# Patient Record
Sex: MALE | Race: WHITE | NOT HISPANIC OR LATINO | Employment: FULL TIME | URBAN - METROPOLITAN AREA
[De-identification: names, ages, dates, MRNs, and addresses within clinical notes are randomized per-mention and may not be internally consistent; named-entity substitution may affect disease eponyms.]

---

## 2019-01-09 ENCOUNTER — TELEPHONE (OUTPATIENT)
Dept: GASTROENTEROLOGY | Facility: CLINIC | Age: 59
End: 2019-01-09

## 2019-01-09 NOTE — TELEPHONE ENCOUNTER
Pt confirmed that he's still coming to his appt on 01/11/19 at 11:00AM.     ----- Message from Montserrat Garnica sent at 1/9/2019 11:40 AM CST -----  Contact: pt#[phone]  Needs Advice    Reason for call:Pt states that he received a call that he needed to call to confirm appt         Communication Preference:call    Additional Information:

## 2019-01-11 ENCOUNTER — OFFICE VISIT (OUTPATIENT)
Dept: GASTROENTEROLOGY | Facility: CLINIC | Age: 59
End: 2019-01-11
Payer: COMMERCIAL

## 2019-01-11 VITALS
HEART RATE: 64 BPM | SYSTOLIC BLOOD PRESSURE: 144 MMHG | HEIGHT: 73 IN | WEIGHT: 213.81 LBS | BODY MASS INDEX: 28.34 KG/M2 | DIASTOLIC BLOOD PRESSURE: 91 MMHG

## 2019-01-11 DIAGNOSIS — R10.84 GENERALIZED ABDOMINAL PAIN: ICD-10-CM

## 2019-01-11 DIAGNOSIS — K58.0 IRRITABLE BOWEL SYNDROME WITH DIARRHEA: Primary | ICD-10-CM

## 2019-01-11 PROCEDURE — 99999 PR PBB SHADOW E&M-EST. PATIENT-LVL III: ICD-10-PCS | Mod: PBBFAC,,, | Performed by: PHYSICIAN ASSISTANT

## 2019-01-11 PROCEDURE — 99204 OFFICE O/P NEW MOD 45 MIN: CPT | Mod: S$GLB,,, | Performed by: PHYSICIAN ASSISTANT

## 2019-01-11 PROCEDURE — 99204 PR OFFICE/OUTPT VISIT, NEW, LEVL IV, 45-59 MIN: ICD-10-PCS | Mod: S$GLB,,, | Performed by: PHYSICIAN ASSISTANT

## 2019-01-11 PROCEDURE — 3008F BODY MASS INDEX DOCD: CPT | Mod: CPTII,S$GLB,, | Performed by: PHYSICIAN ASSISTANT

## 2019-01-11 PROCEDURE — 99999 PR PBB SHADOW E&M-EST. PATIENT-LVL III: CPT | Mod: PBBFAC,,, | Performed by: PHYSICIAN ASSISTANT

## 2019-01-11 PROCEDURE — 3008F PR BODY MASS INDEX (BMI) DOCUMENTED: ICD-10-PCS | Mod: CPTII,S$GLB,, | Performed by: PHYSICIAN ASSISTANT

## 2019-01-11 RX ORDER — MONTELUKAST SODIUM 4 MG/1
1 TABLET, CHEWABLE ORAL 2 TIMES DAILY
Qty: 60 TABLET | Refills: 3 | Status: SHIPPED | OUTPATIENT
Start: 2019-01-11 | End: 2021-08-16 | Stop reason: SDUPTHER

## 2019-01-11 RX ORDER — DICYCLOMINE HYDROCHLORIDE 10 MG/1
10 CAPSULE ORAL EVERY 8 HOURS PRN
Qty: 90 CAPSULE | Refills: 1 | Status: SHIPPED | OUTPATIENT
Start: 2019-01-11 | End: 2019-02-10

## 2019-01-11 NOTE — PROGRESS NOTES
[facility] Consultation Note    Reason for Consult:  The primary encounter diagnosis was Irritable bowel syndrome with diarrhea. A diagnosis of Generalized abdominal pain was also pertinent to this visit.    PCP:   Rain Ash   No address on file    Referring MD:  Aaareferral Self  No address on file    HPI:  This is a 58 y.o. male  With Hx of IBS and C. Diff presents for evaluation of chronic diarrhea  Last seen by Dr Boland in 2013. Questran and bentyl were prescribed with improvement    Today he reports worsening chronic post-prandial dairrhea x 1 year  6-8 BM daily, Watery  During periods of fasting - will still have a BM  Associated abdominal cramping pain  Denies rectal bleeding    Large intake of artificial sweeteners    No relief with diet changes   Denies med changes    Recent thyroid test and Cbc were normal    ROS:  Constitutional: No fevers, chills, No weight loss  ENT: No allergies  CV: No chest pain  Pulm: No cough, No shortness of breath  Ophtho: No vision changes  GI: see HPI  Derm: No rash  Heme: No lymphadenopathy, No bruising  MSK: No arthritis  : No dysuria, No hematuria  Endo: No hot or cold intolerance  Neuro: No syncope, No seizure  Psych: No anxiety, No depression    Medical History:  has a past medical history of Chronic diarrhea, GERD (gastroesophageal reflux disease), Hernia of unspecified site of abdominal cavity without mention of obstruction or gangrene, Intestinal infection due to Clostridium difficile, Irritable bowel syndrome with diarrhea (1/11/2019), and Personal history of kidney stones.    Surgical History:  has a past surgical history that includes Hiatal hernia repair; Hernia repair; Colonoscopy; Upper gastrointestinal endoscopy; Amputation (left great toe); Foot surgery; Knee surgery; Back surgery; Neck surgery; Cholecystectomy; Sinus surgery; Hernia repair; Fracture surgery; and Spine surgery.    Family History: family history includes Cancer in  "his brother; Heart disease in his father; Hyperlipidemia in his father and mother; Hypertension in his father; Stroke in his father..     Social History:  reports that  has never smoked. He does not have any smokeless tobacco history on file. He reports that he drinks alcohol. He reports that he does not use drugs.    Review of patient's allergies indicates:  No Known Allergies    Current Outpatient Medications on File Prior to Visit   Medication Sig Dispense Refill    buPROPion (WELLBUTRIN XL) 300 MG 24 hr tablet Take 300 mg by mouth once daily once daily.      escitalopram (LEXAPRO) 20 MG tablet Take by mouth. 1 Tablet Oral Every day      testosterone cypionate (DEPO-TESTOSTERONE) 200 mg/mL injection Inject 1 mg/mL into the muscle. 1 Oil Intramuscular 2 weeks.  200 mg every 2 weeks       [DISCONTINUED] atorvastatin (LIPITOR) 20 MG tablet Take 20 mg by mouth once daily.        [DISCONTINUED] cholestyramine (QUESTRAN) 4 gram packet Take 1 packet (4 g total) by mouth 2 (two) times daily. 60 packet 3    [DISCONTINUED] ketorolac (TORADOL) 10 mg tablet Take 10 mg by mouth every 8 (eight) hours as needed. 1 Tablet Oral Every 8 hours.  Do not use > 3 consecutive days       No current facility-administered medications on file prior to visit.          Objective Findings:    Vital Signs:  BP (!) 144/91   Pulse 64   Ht 6' 1" (1.854 m)   Wt 97 kg (213 lb 12.8 oz)   BMI 28.21 kg/m²   Body mass index is 28.21 kg/m².    Physical Exam:  General Appearance: Well appearing in no acute distress  Head:   Normocephalic, without obvious abnormality  Eyes:    No scleral icterus  ENT: Neck supple, Lips, mucosa, and tongue normal  Lungs: CTA bilaterally in anterior and posterior fields, no wheezes, no crackles.  Heart:  Regular rate and rhythm, S1, S2 normal, no murmurs heard  Abdomen: Soft, hyperactive bowel sounds, lower abdominal tenderness, non distended with positive bowel sounds in all four quadrants.   Extremities: no  " edema  Skin: No rash  Neurologic: AAO x 3      Labs:  Lab Results   Component Value Date    WBC 6.97 04/05/2013    HGB 15.9 04/05/2013    HCT 45.7 04/05/2013     04/05/2013    ALT 26 04/05/2013    AST 23 04/05/2013     04/05/2013    K 4.2 04/05/2013     04/05/2013    CREATININE 1.0 04/05/2013    BUN 17 04/05/2013    CO2 25 04/05/2013    TSH 0.947 04/05/2013       Imaging:    Endoscopy:    EGD 4/2013  Colon 12/2012 - f/u in 7yrs  Assessment:  1. Irritable bowel syndrome with diarrhea    2. Generalized abdominal pain       57yo M s/p bushra with Hx of C. Diff and  IBS presents with worsening abdominal pain and mainly post-pradial diarrhea x 1 yr    Recommendations:  1. stool tests to rule out  infection, inflammation, and malabsorption.    2. colestid BID. He did not want to take questran    3. Bentyl 10 prn    4. Avoid artificial sweeteners  Due for a colonoscopy 12/2019    No Follow-up on file.      Order summary:  Orders Placed This Encounter    Clostridium difficile EIA    Stool culture    Stool Exam-Ova,Cysts,Parasites    Stool Exam-Ova,Cysts,Parasites    Stool Exam-Ova,Cysts,Parasites    Giardia / Cryptosporidum, EIA    Giardia / Cryptosporidum, EIA    Giardia / Cryptosporidum, EIA    WBC, Stool    Fecal fat, qualitative    colestipol (COLESTID) 1 gram Tab    dicyclomine (BENTYL) 10 MG capsule         Thank you so much for allowing me to participate in the care of Blaine Mccurdy PA-C

## 2019-01-11 NOTE — PATIENT INSTRUCTIONS
Avoid artificial sweeterners    Low Fodmap Diet    Fodmaps (fructo,oligo,mono saccharides and polyols) are contained in certain foods and can cause significant bloating in some people. Reducing these foods can reduce bloating. Start off by cutting out anything with high fructose corn syrup in the ingredients.        Foods suitable on a low-fodmap diet  fruit  banana, blueberry, boysenberry, canteloupe, cranberry, durian, grape,  grapefruit, honeydew melon, kiwifruit, lemon,lime, mandarin, orange,  passionfruit, pawpaw, raspberry, rhubarb, rockmelon, star anise,  strawberry, tangelo  vegetables  alfalfa, artichoke, bamboo shoots, bean shoots, bok grant,  carrot, celery, choko, grant sum, endive, jayme, green beans,  lettuce, olives, parsnip, potato, pumpkin, red capsicum (bell pepper),  silver beet, spinach, summer squash (yellow), swede, sweet potato, taro, tomato,  turnip, yam, zucchini   herbs  basil, chili, coriander, jayme, lemongrass,   marjoram, mint, oregano, parsley, rosemary, thyme  milk  lactose-free milk, oat milk*, rice milk, soy milk*  cheeses  hard cheeses, and brie and camembert  yoghurt  lactose-free varieties  ice-cream substitutes  gelati, sorbet  butter substitutes  olive oilgrain foods  cereals  gluten-free bread or cereal products  bread  100% spelt bread  Rice, oats, polenta, other  arrowroot, millet, psyllium, quinoa, sorgum, tapioca  sweeteners  sugar* (sucrose), glucose, artificial sweeteners not ending in -ol  honey substitutes  june syrup*, maple syrup*, molasses, treacle  *small quantities  *check for additives  Note: if fruit is dried, eat in small quantities    excess fructose lactose fructans galactans polyols      Eliminate foods containing fodmaps  fruit  apple, apricot, avocado, blackberry, cherry, lychee, nashi, nectarine, peach, pear, plum,  prune, watermelon, sherwin, tinned fruit in natural juice,   sweeteners  sorbitol (420)  mannitol (421)  isomalt (953)  maltitol  (965)  xylitol (967)  legumes  baked beans, chickpeas, kidney beans, lentils  vegetables  asparagus, beetroot, broccoli, brussels sprouts, cabbage, eggplant, fennel, garlic,  dar, okra, onion (all), shallots, spring onion, cauliflower, green capsicum (bell pepper), mushroom, sweet corn  cereals  wheat and rye, in large amounts eg. Bread, crackers, cookies, couscous, pasta  fruit  custard apple, persimmon, watermelon  miscellaneous  chicory, dandelion, inulin  sweeteners  fructose, high fructose corn syrup  large total fructose dose  concentrated fruit sources, large servings of fruit, dried fruit, fruit juice  honey  corn syrup, fruisana  milk  milk from cows, goats or sheep, custard, ice cream, yoghurt  cheeses  soft unripened cheeses eg. cottage, cream, mascarpone    Additional FODMAPs Diet Reading List    IBS-Free At Last!: Second Edition: Change Your Carbs, Change Your Life with the FODMAP Elimination Diet, 2 nd edition by Rachel Garg MS, RD     2012 copyright; [employer].   ISBN: 407-4-4204337-2-1    The Complete Idiots Guide To Living Well With IBS by Janet Nicolas RD, DALILAN  [facility], 2010    The FODMAPs Approach:--Minimize Consumption of Fermentable Carbs to Manage Functional Gut Disorder Symptoms  by Janet Nicolas RD, DALILAN article found in  Todays Dietitian, vol. 12, no. 8, p. 30    The Inside Tract: Your Good Gut Guide To Great Digestive Health by Seven Barnett MD and Jodi Crenshaw, MS, RD, LDN  2011 copyright, [employer]   ISBN: [phone]    List assembled by: Jaqueline Ochoa, MS, RD, LDN, CDE [facility]  Last Updated: 7/13/12

## 2019-01-14 ENCOUNTER — LAB VISIT (OUTPATIENT)
Dept: LAB | Facility: HOSPITAL | Age: 59
End: 2019-01-14
Attending: INTERNAL MEDICINE
Payer: COMMERCIAL

## 2019-01-14 DIAGNOSIS — K58.0 IRRITABLE BOWEL SYNDROME WITH DIARRHEA: ICD-10-CM

## 2019-01-14 PROCEDURE — 87329 GIARDIA AG IA: CPT

## 2019-01-14 PROCEDURE — 87209 SMEAR COMPLEX STAIN: CPT

## 2019-01-15 ENCOUNTER — LAB VISIT (OUTPATIENT)
Dept: LAB | Facility: HOSPITAL | Age: 59
End: 2019-01-15
Attending: PHYSICIAN ASSISTANT
Payer: COMMERCIAL

## 2019-01-15 DIAGNOSIS — K58.0 IRRITABLE BOWEL SYNDROME WITH DIARRHEA: ICD-10-CM

## 2019-01-15 LAB
CRYPTOSP AG STL QL IA: NEGATIVE
G LAMBLIA AG STL QL IA: NEGATIVE
O+P STL TRI STN: NORMAL

## 2019-01-15 PROCEDURE — 87045 FECES CULTURE AEROBIC BACT: CPT

## 2019-01-15 PROCEDURE — 89125 SPECIMEN FAT STAIN: CPT

## 2019-01-15 PROCEDURE — 87329 GIARDIA AG IA: CPT

## 2019-01-15 PROCEDURE — 87046 STOOL CULTR AEROBIC BACT EA: CPT | Mod: 59

## 2019-01-15 PROCEDURE — 87427 SHIGA-LIKE TOXIN AG IA: CPT | Mod: 59

## 2019-01-15 PROCEDURE — 89055 LEUKOCYTE ASSESSMENT FECAL: CPT

## 2019-01-16 ENCOUNTER — LAB VISIT (OUTPATIENT)
Dept: LAB | Facility: HOSPITAL | Age: 59
End: 2019-01-16
Attending: PHYSICIAN ASSISTANT
Payer: COMMERCIAL

## 2019-01-16 DIAGNOSIS — K58.0 IRRITABLE BOWEL SYNDROME WITH DIARRHEA: ICD-10-CM

## 2019-01-16 LAB
CRYPTOSP AG STL QL IA: NEGATIVE
FAT STL SUDAN IV STN: NORMAL
G LAMBLIA AG STL QL IA: NEGATIVE
WBC #/AREA STL HPF: ABNORMAL /[HPF]

## 2019-01-16 PROCEDURE — 87209 SMEAR COMPLEX STAIN: CPT

## 2019-01-16 PROCEDURE — 87329 GIARDIA AG IA: CPT

## 2019-01-17 LAB
CRYPTOSP AG STL QL IA: NEGATIVE
E COLI SXT1 STL QL IA: NEGATIVE
E COLI SXT2 STL QL IA: NEGATIVE
G LAMBLIA AG STL QL IA: NEGATIVE
O+P STL TRI STN: NORMAL

## 2019-01-18 ENCOUNTER — TELEPHONE (OUTPATIENT)
Dept: GASTROENTEROLOGY | Facility: CLINIC | Age: 59
End: 2019-01-18

## 2019-01-18 NOTE — TELEPHONE ENCOUNTER
Attempted to call pt regarding stool results pt didn't answer left vm instructing pt to call back.       ----- Message from Ned Mccurdy PA-C sent at 1/18/2019  2:16 PM CST -----  Please notify the pt that his stool tests were negative for infection

## 2019-01-19 ENCOUNTER — TELEPHONE (OUTPATIENT)
Dept: GASTROENTEROLOGY | Facility: CLINIC | Age: 59
End: 2019-01-19

## 2019-01-19 DIAGNOSIS — K52.9 CHRONIC DIARRHEA: Primary | ICD-10-CM

## 2019-01-19 DIAGNOSIS — R10.30 LOWER ABDOMINAL PAIN: ICD-10-CM

## 2019-01-19 LAB — BACTERIA STL CULT: NORMAL

## 2019-01-21 NOTE — TELEPHONE ENCOUNTER
Attempted to call pt regarding stool results and ct scan pt didn't answer left vm instructing pt to call back.

## 2019-01-24 ENCOUNTER — TELEPHONE (OUTPATIENT)
Dept: GASTROENTEROLOGY | Facility: CLINIC | Age: 59
End: 2019-01-24

## 2019-01-24 NOTE — TELEPHONE ENCOUNTER
Pt aware and understanding of his stool results and CT scan appt on 02/07/19 at 12:15PM and lab appt 02/07/19 at 11:00AM. Informed pt that his stool kit is at the  ready for pickup. Will mail confirmation.         ----- Message from Amber Chance sent at 1/24/2019  2:10 PM CST -----  Contact: Self- [phone]  Kaz- pt returning a missed call regarding stool results and ct scan- please contact pt at [phone]

## 2019-02-07 ENCOUNTER — HOSPITAL ENCOUNTER (OUTPATIENT)
Dept: RADIOLOGY | Facility: HOSPITAL | Age: 59
Discharge: HOME | End: 2019-02-07
Attending: PHYSICIAN ASSISTANT
Payer: COMMERCIAL

## 2019-02-07 DIAGNOSIS — R10.30 LOWER ABDOMINAL PAIN: ICD-10-CM

## 2019-02-07 DIAGNOSIS — K52.9 CHRONIC DIARRHEA: ICD-10-CM

## 2019-02-07 PROCEDURE — 74177 CT ABD & PELVIS W/CONTRAST: CPT | Mod: 26,,, | Performed by: RADIOLOGY

## 2019-02-07 PROCEDURE — 74177 CT ABD & PELVIS W/CONTRAST: CPT | Mod: TC

## 2019-02-07 PROCEDURE — 25500020 PHARM REV CODE 255: Performed by: PHYSICIAN ASSISTANT

## 2019-02-07 PROCEDURE — 74177 CT ABDOMEN PELVIS WITH CONTRAST: ICD-10-PCS | Mod: 26,,, | Performed by: RADIOLOGY

## 2019-02-07 RX ADMIN — IOHEXOL 15 ML: 350 INJECTION, SOLUTION INTRAVENOUS at 11:02

## 2019-02-07 RX ADMIN — IOHEXOL 100 ML: 350 INJECTION, SOLUTION INTRAVENOUS at 01:02

## 2019-02-08 ENCOUNTER — TELEPHONE (OUTPATIENT)
Dept: GASTROENTEROLOGY | Facility: CLINIC | Age: 59
End: 2019-02-08

## 2019-02-08 DIAGNOSIS — K52.9 CHRONIC DIARRHEA: Primary | ICD-10-CM

## 2019-02-08 DIAGNOSIS — K76.0 HEPATIC STEATOSIS: Primary | ICD-10-CM

## 2019-02-08 RX ORDER — CIPROFLOXACIN 500 MG/1
500 TABLET ORAL 2 TIMES DAILY
Qty: 28 TABLET | Refills: 0 | Status: SHIPPED | OUTPATIENT
Start: 2019-02-08 | End: 2019-02-15

## 2019-02-08 RX ORDER — METRONIDAZOLE 500 MG/1
500 TABLET ORAL 3 TIMES DAILY
Qty: 42 TABLET | Refills: 0 | Status: SHIPPED | OUTPATIENT
Start: 2019-02-08 | End: 2019-02-15

## 2019-02-08 NOTE — TELEPHONE ENCOUNTER
----- Message from Talon Dhillon MA sent at 2/8/2019  2:17 PM CST -----  Pt stated that when he eats the food goes straight through him and he also has abdominal cramping if he eats anything.

## 2019-02-08 NOTE — TELEPHONE ENCOUNTER
Attempted to call pt regarding huis CT results and get an update on his symptoms pt didn't answer left vm instructing pt to call back.

## 2019-02-08 NOTE — TELEPHONE ENCOUNTER
Pt aware and understanding of his CT scan results and appt with liver specialist on 02/15/19 at 1:40PM. Will mail conformation.         ----- Message from Chloé Yoder MA sent at 2/8/2019  1:48 PM CST -----  Contact: [phone]  Patient Returning Call from [facility]    Who Left Message for Patient: Talon     Communication Preference: [phone]    Additional Information: Returning a call re: ct scan results

## 2019-02-11 ENCOUNTER — TELEPHONE (OUTPATIENT)
Dept: GASTROENTEROLOGY | Facility: CLINIC | Age: 59
End: 2019-02-11

## 2019-02-11 NOTE — TELEPHONE ENCOUNTER
Spoke with pt wife per pt requestand informed her of his lab results, CT scan results, appt with liver specialist, and Kaz sent over 2 antibiotics to his pharmacy to take for his symptoms and if he was still feeling bad then he should consider getting a colonoscopy.

## 2019-02-11 NOTE — TELEPHONE ENCOUNTER
Pt aware and understanding of his lab results and Kaz sent over 2 antibiotics to his pharmacy for his symptom,s and if he doesn't fell better then consider getting a colonoscopy.           ----- Message from Ned Mccurdy PA-C sent at 2/10/2019 10:50 AM CST -----  Please notify the pt that his recent labwork looked good. No signs of infection or anemia. Liver and kidney labs are normal

## 2019-02-15 ENCOUNTER — LAB VISIT (OUTPATIENT)
Dept: LAB | Facility: HOSPITAL | Age: 59
End: 2019-02-15
Attending: PHYSICIAN ASSISTANT
Payer: COMMERCIAL

## 2019-02-15 ENCOUNTER — OFFICE VISIT (OUTPATIENT)
Dept: HEPATOLOGY | Facility: CLINIC | Age: 59
End: 2019-02-15
Payer: COMMERCIAL

## 2019-02-15 ENCOUNTER — PROCEDURE VISIT (OUTPATIENT)
Dept: HEPATOLOGY | Facility: CLINIC | Age: 59
End: 2019-02-15
Payer: COMMERCIAL

## 2019-02-15 VITALS
WEIGHT: 212.75 LBS | BODY MASS INDEX: 28.2 KG/M2 | HEIGHT: 73 IN | RESPIRATION RATE: 18 BRPM | SYSTOLIC BLOOD PRESSURE: 137 MMHG | OXYGEN SATURATION: 96 % | HEART RATE: 73 BPM | DIASTOLIC BLOOD PRESSURE: 81 MMHG

## 2019-02-15 DIAGNOSIS — K76.0 HEPATIC STEATOSIS: Primary | ICD-10-CM

## 2019-02-15 DIAGNOSIS — E66.3 OVERWEIGHT (BMI 25.0-29.9): ICD-10-CM

## 2019-02-15 DIAGNOSIS — K76.9 LIVER LESION: ICD-10-CM

## 2019-02-15 DIAGNOSIS — K76.0 HEPATIC STEATOSIS: ICD-10-CM

## 2019-02-15 DIAGNOSIS — K52.9 CHRONIC DIARRHEA: ICD-10-CM

## 2019-02-15 PROCEDURE — 3008F BODY MASS INDEX DOCD: CPT | Mod: CPTII,S$GLB,, | Performed by: NURSE PRACTITIONER

## 2019-02-15 PROCEDURE — 99204 PR OFFICE/OUTPT VISIT, NEW, LEVL IV, 45-59 MIN: ICD-10-PCS | Mod: S$GLB,,, | Performed by: NURSE PRACTITIONER

## 2019-02-15 PROCEDURE — 99999 PR PBB SHADOW E&M-EST. PATIENT-LVL IV: ICD-10-PCS | Mod: PBBFAC,,, | Performed by: NURSE PRACTITIONER

## 2019-02-15 PROCEDURE — 87209 SMEAR COMPLEX STAIN: CPT

## 2019-02-15 PROCEDURE — 99999 PR PBB SHADOW E&M-EST. PATIENT-LVL IV: CPT | Mod: PBBFAC,,, | Performed by: NURSE PRACTITIONER

## 2019-02-15 PROCEDURE — 91200 LIVER ELASTOGRAPHY: CPT | Mod: S$GLB,,, | Performed by: NURSE PRACTITIONER

## 2019-02-15 PROCEDURE — 91200 PR LIVER ELASTOGRAPHY W/OUT IMAG W/INTERP & REPORT: ICD-10-PCS | Mod: S$GLB,,, | Performed by: NURSE PRACTITIONER

## 2019-02-15 PROCEDURE — 99204 OFFICE O/P NEW MOD 45 MIN: CPT | Mod: S$GLB,,, | Performed by: NURSE PRACTITIONER

## 2019-02-15 PROCEDURE — 3008F PR BODY MASS INDEX (BMI) DOCUMENTED: ICD-10-PCS | Mod: CPTII,S$GLB,, | Performed by: NURSE PRACTITIONER

## 2019-02-15 RX ORDER — AMOXICILLIN 500 MG
CAPSULE ORAL DAILY
COMMUNITY
End: 2021-11-17

## 2019-02-15 NOTE — LETTER
February 15, 2019      Ned Mccurdy PA-C  [address]  [facility] [address]           [facility]  [address]  [facility] [address]  Phone: [phone]  Fax: [phone]          Patient: Blaine Tong   MR Number: [id number]   YOB: 1960   Date of Visit: 2/15/2019       Dear Ned Mccurdy:    Thank you for referring Blaine Tong to me for evaluation. Attached you will find relevant portions of my assessment and plan of care.    If you have questions, please do not hesitate to call me. I look forward to following Blaine Tong along with you.    Sincerely,    Margi Pfeiffer, NP    Enclosure  CC:  No Recipients    If you would like to receive this communication electronically, please contact [email or web address] or [phone] to request more information on [employer] access.    For providers and/or their staff who would like to refer a patient to [facility], please contact us through our one-stop-shop provider referral line, [facility] , at [phone].    If you feel you have received this communication in error or would no longer like to receive these types of communications, please e-mail [email or web address]

## 2019-02-15 NOTE — PROCEDURES
Fibroscan Procedure     Name: Blaine Tong  Date of Procedure : 02/15/2019   :: Margi Pfeiffer NP  Diagnosis: Alcohol    Probe: XL    Fibroscan readin.3 KPa    Fibrosis:F 0-1     CAP readin dB/m    Steatosis: :S3

## 2019-02-15 NOTE — PATIENT INSTRUCTIONS
1. Fibroscan today  2. Will check immunity markers for Hepatitis A and B and arrange for vaccination if needed  3. Labs today to rule out other causes of liver disease. I will send you the results of your labs when they are all resulted, which is typically 1 week after your visit  4.  Follow up in 3 months with Dr. Diaz with labs and MRI same day    Consider decreasing alcohol intake to less than CDC definition of moderate alcohol intake, which is <2 servings of alcohol (beer, wine, or liquor) in a daily sitting. But I do not recommend daily alcohol intake    There is no FDA approved therapy for non-alcoholic fatty liver disease. Therefore, these things are important:  1. Weight loss goal of ~20 lbs  2. Low carb/sugar, high fiber and protein diet  3. Exercise, 5 days per week, 30 minutes per day, as tolerated  4. Recommend good cholesterol, blood pressure, blood sugar levels     Fatty liver can progress to steatohepatitis (inflamatory fatty liver) and possibly to cirrhosis, putting one at increased risk for liver cancer, liver failure, and death

## 2019-02-15 NOTE — PROGRESS NOTES
I have personally performed a face to face diagnostic evaluation on this patient. I have reviewed and agree with today's findings and the care plan outlined by Margi Pfeiffer NP      My findings are as follows:  Patient presents with hepatic steatosis on imaging after GI investigations    -  alcohol use about 12 beers per week with excessive use in the past  - ?alcohol overuse/NAFLD    - Body mass index is 28.07 kg/m².    - labs/fibroscan  - reduce alcohol intake  - repeat imaging with MRI in 3 months given that he has some subcm liver lesions    he will return to see me for follow up with MRI in 3 months

## 2019-02-15 NOTE — PROGRESS NOTES
[facility] VISIT NEW PT NOTE    REFERRING PROVIDER:  Ned Mccurdy    CHIEF COMPLAINT: fatty liver, subcm liver lesions x3    HPI: This is a 58 y.o. White male with PMH noted below, presenting for evaluation of    fatty liver disease without elevated liver enzymes.    Presents today with wife    Labs done 2/15/19 show normal transaminase levels in 2013 (no labs since)  Platelets, alk phos WNL.   Duration of elevated transaminase levels - normal in 2012 and 2013. Elevation of ALT x1  1/2013  Synthetic liver functioning WNL (but not recent labs)    Lab Results   Component Value Date    ALT 26 04/05/2013    AST 23 04/05/2013    ALKPHOS 99 04/05/2013    BILITOT 0.3 04/05/2013    ALBUMIN 4.2 04/05/2013     02/07/2019     Limited previous serologic w/u negative for Hep A, B and C in 2012    CT abd 2/7/19 noted liver hypoattenuating, suggesting steatosis. 3 subcentimeter hypoattenuating foci within the right hepatic lobe, too small for characterization, punctate in size  No biliary dilation. No pancreatic duct dilation.   No focal abnormalities noted on CT in 2013    Risk factors for fatty liver disease include alcohol use.   Minimal risk factors for NAFLD include being overweight. Weight gain in the past ~ 1 year    Denies family history of liver disease. + consistent Alcohol consumption, see below  Social History     Substance and Sexual Activity   Alcohol Use Yes    Comment: daily alcohol use for ~10 years until age 30. 18 beers weekly for most of life      Immunity to Hep A and B - will check today    Denies jaundice, dark urine, abdominal distention, hematemesis, melena, slowed mentation. No abnormal skin rashes. No generalized joint or muscle pain.      Review of patient's allergies indicates:  No Known Allergies    Current Outpatient Medications on File Prior to Visit   Medication Sig Dispense Refill    buPROPion (WELLBUTRIN XL) 300 MG 24 hr tablet Take 300 mg by mouth once daily once  daily.      colestipol (COLESTID) 1 gram Tab Take 1 tablet (1 g total) by mouth 2 (two) times daily. Separate from other medicine by 4hrs 60 tablet 3    escitalopram (LEXAPRO) 20 MG tablet Take 10 mg by mouth. 1 Tablet Oral Every day      fish oil-omega-3 fatty acids 300-1,000 mg capsule Take by mouth once daily.      testosterone cypionate (DEPO-TESTOSTERONE) 200 mg/mL injection Inject 1 mg/mL into the muscle. 1 Oil Intramuscular 2 weeks.  200 mg every 2 weeks       [DISCONTINUED] ciprofloxacin HCl (CIPRO) 500 MG tablet Take 1 tablet (500 mg total) by mouth 2 (two) times daily. Take with food. for 14 days 28 tablet 0    [DISCONTINUED] metroNIDAZOLE (FLAGYL) 500 MG tablet Take 1 tablet (500 mg total) by mouth 3 (three) times daily. Take with food. Avoid alcohol. for 14 days 42 tablet 0     No current facility-administered medications on file prior to visit.        PMHX:  has a past medical history of Chronic diarrhea, GERD (gastroesophageal reflux disease), Hepatic steatosis (2/15/2019), Hernia of unspecified site of abdominal cavity without mention of obstruction or gangrene, HLD (hyperlipidemia), Intestinal infection due to Clostridium difficile, Irritable bowel syndrome with diarrhea (1/11/2019), Overweight (BMI 25.0-29.9) (2/15/2019), and Personal history of kidney stones.    PSHX:  has a past surgical history that includes Hiatal hernia repair; Colonoscopy; Upper gastrointestinal endoscopy; Amputation (left great toe); Foot surgery; Knee surgery; Back surgery; Neck surgery; Cholecystectomy; Sinus surgery; Fracture surgery; Spine surgery; and Inguinal hernia repair.    FAMILY HISTORY: Negative for liver disease, reviewed in [facility]    SOCIAL HISTORY:   Social History     Tobacco Use   Smoking Status Never Smoker       Social History     Substance and Sexual Activity   Alcohol Use Yes    Comment: daily alcohol use for ~10 years until age 30. 18 beers weekly for most of life        Social History     Substance  "and Sexual Activity   Drug Use No       ROS:   GENERAL: Denies fever, chills, + self reported weight gain, denies fatigue  HEENT: Denies headaches, dizziness, vision/hearing changes  CARDIOVASCULAR: Denies chest pain, palpitations, or edema  RESPIRATORY: Denies dyspnea, cough  GI: Denies abdominal pain, rectal bleeding, nausea, vomiting. No change in bowel pattern or color  : Denies dysuria, hematuria   SKIN: Denies rash, itching   NEURO: Denies confusion, memory loss, or mood changes  PSYCH: Denies depression or anxiety  HEME/LYMPH: Denies easy bruising or bleeding    PHYSICAL EXAM:   Friendly White male, in no acute distress; alert and oriented to person, place and time  VITALS: /81 (BP Location: Left arm, Patient Position: Sitting, BP Method: Medium (Automatic))   Pulse 73   Resp 18   Ht 6' 1" (1.854 m)   Wt 96.5 kg (212 lb 11.9 oz)   SpO2 96%   BMI 28.07 kg/m²   HENT: Normocephalic, without obvious abnormality. Oral mucosa pink and moist. Dentition good.  EYES: Sclerae anicteric. No conjunctival pallor.   NECK: Supple. No masses or cervical adenopathy.  CARDIOVASCULAR: Regular rate and rhythm. No murmurs.  RESPIRATORY: Normal respiratory effort. BBS CTA. No wheezes or crackles.  GI: Soft, non-tender, non-distended. No hepatosplenomegaly. No masses palpable. No ascites.  EXTREMITIES:  No clubbing, cyanosis or edema.  SKIN: Warm and dry. No jaundice. No rashes noted to exposed skin. No telangectasias noted. No palmar erythema.  NEURO:  Normal gait. No asterixis.  PSYCH:  Memory intact. Thought and speech pattern appropriate. Behavior normal. No depression or anxiety noted.    RECENT LABS:    Hepatitis A and B immunity markers:    No results found for: HEPAIGG    Hepatitis B Surface Ag   Date Value Ref Range Status   12/12/2012 Negative Negative Final     Comment:     If further testing is needed, please contact the Blood Bank  within 3 days.     No results found for: HEPBCAB  No results found for: " HEPBSAB    There is no immunization history on file for this patient.    Labs:  Lab Results   Component Value Date    WBC 6.56 02/07/2019    HGB 15.0 02/07/2019    HCT 44.7 02/07/2019     02/07/2019     02/07/2019    K 4.4 02/07/2019    CREATININE 1.0 02/07/2019    ALT 26 04/05/2013    AST 23 04/05/2013    ALKPHOS 99 04/05/2013    BILITOT 0.3 04/05/2013    ALBUMIN 4.2 04/05/2013       DIAGNOSTIC STUDIES:  EGD-  COLONOSCOPY-  ABD. U/S-  CT SCAN-   Done 2/7/19  FINDINGS:  Images of the lower thorax are remarkable for dependent atelectasis.    The liver is hypoattenuating, suggesting steatosis, correlation with LFTs recommended.  There are 3 subcentimeter hypoattenuating foci within the right hepatic lobe, too small for characterization, punctate in size.  The spleen, pancreas and adrenal glands are grossly unremarkable.  The gallbladder is surgically absent.  There is no biliary dilation or ascites.  The pancreatic duct is not dilated.  The portal vein, splenic vein, SMV, celiac axis and SMA all are patent.  There is a mild to moderate residual contrast within the gastric lumen.  No significant abdominal lymphadenopathy.    The kidneys enhance symmetrically without hydronephrosis or nephrolithiasis.  The urinary bladder is decompressed, likely accounting for mild wall thickening.  The prostate is not enlarged.    There are several scattered colonic diverticula.  There is mild thickening of the rectosigmoid colon, similar to the previous examination noting the colon is decompressed in the region.  No surrounding inflammation to suggest acute diverticulitis.  The terminal ileum and appendix are unremarkable.  The small bowel is grossly unremarkable.  No bulky pelvic lymphadenopathy.  No focal organized pelvic fluid collection.  There is a fat containing left inguinal hernia.    Postsurgical changes are noted in the region of the left hemiscrotum.  Degenerative changes are noted of the spine noting sequela  of previous surgery.  No significant inguinal lymphadenopathy.      Impression       1. Findings suggesting hepatic steatosis, correlation with LFTs recommended.  2. Scattered colonic diverticula without findings to suggest diverticulitis.  3. Status post cholecystectomy, and additional findings above.       MRI-  LIVER BIOPSY-    FIBROSCAN -  Done today 2/15/19   Fibroscan readin.3 KPa  Fibrosis:F 0-1   CAP readin dB/m  Steatosis: :S3     ASSESSMENT:  58 y.o. White male with:  1.  Fatty liver  - no recent transaminases labs, previous in  WNL, wife and pt report recollection that labs normal with PCP  -- Limited previous serologic w/u negative for Hep A, B and C in   -- CT abd 19 noted liver hypoattenuating, suggesting steatosis. 3 subcentimeter hypoattenuating foci within the right hepatic lobe, too small for characterization, punctate in size  No biliary dilation. No pancreatic duct dilation.   No focal abnormalities noted on CT in   -- Risk factors for fatty liver disease include alcohol use. Minimal risk factors for NAFLD include being overweight. Weight gain in the past ~ 1 year  -- Immunity to Hep A and B : will check today   -- Fibroscan results today - no fibrosis, significant steatosis    2. Liver lesions  -- CT 2019 showed 3 subcentimeter hypoattenuating foci within the right hepatic lobe, too small for characterization, punctate in size  -- not present on CT in     3. Overweight  -- Body mass index is 28.07 kg/m².   -- can increase the risk for NAFLD    4. Alcohol use   Social History     Substance and Sexual Activity   Alcohol Use Yes    Comment: daily alcohol use for ~10 years until age 30. 18 beers weekly for most of life      EDUCATION:   We discussed the manifestations of alcohol and non-alcoholic fatty liver disease. At this time, there are no FDA approved therapy for non-alcoholic fatty liver disease.  The patient and I discussed the importance of diet, exercise, and  weight loss for management of non-alcoholic fatty liver disease.    Also discussed importance of alcohol abstinence or at least decreasing to no more than CDC definition of moderate alcohol use     We discussed a low carb/sugar, high fiber and protein diet and a goal of 30 minutes of exercise 5 times per week    Patient is aware that fatty liver can progress to steatohepatitis and possibly to cirrhosis, putting one at increased risk for liver cancer, liver failure, and death      PLAN:  1. Fibroscan today  2. Will check immunity markers for HBV/HAV  and arrange for vaccination if needed  3. Labs today   Orders Placed This Encounter   Procedures    MRI Abdomen W WO Contrast    Hepatitis A antibody, IgG    Hepatitis B core antibody, total    Hepatitis B surface antibody    Comprehensive metabolic panel    Protime-INR    Phosphatidylethanol (PETH)    Cancer antigen 19-9    AFP tumor marker    Hepatic function panel     4. Weight loss goal of ~20 lbs  5. Low carb/sugar, high fiber and protein diet  6. Exercise, 5 days per week, 30 minutes per day, as tolerated  7. Recommend good cholesterol, blood pressure, blood sugar levels   8. MRI in 3 months for evaluation of subcm liver lesions noted on CT  9.  Follow-up in about 3 months (around 5/15/2019).  With CMP, MRI before    Thank you for allowing me to participate in the care of Blaine KEO Wright    CC'ed note to:   WINDY Ash MD

## 2019-02-16 ENCOUNTER — LAB VISIT (OUTPATIENT)
Dept: LAB | Facility: HOSPITAL | Age: 59
End: 2019-02-16
Attending: PHYSICIAN ASSISTANT
Payer: COMMERCIAL

## 2019-02-16 DIAGNOSIS — K52.9 CHRONIC DIARRHEA: ICD-10-CM

## 2019-02-16 PROCEDURE — 87209 SMEAR COMPLEX STAIN: CPT

## 2019-02-18 ENCOUNTER — TELEPHONE (OUTPATIENT)
Dept: HEPATOLOGY | Facility: CLINIC | Age: 59
End: 2019-02-18

## 2019-02-18 LAB — O+P STL TRI STN: NORMAL

## 2019-02-18 NOTE — TELEPHONE ENCOUNTER
"Please contact pt, notify him that liver labs were normal, tumor markers normal.     His labs show that he does NOT have immunity against Hepatitis A and B and I recommend that he get the Hep A and B combination vaccine. Please inquire if he wishes to get at local pharmacy or in infectious disease department here and let me know.     His kidney function is on upper end of normal (change for him compared to past labs), so would recommend it is rechecked in a month or so (with PCP, or I can order if cannot obtain through PCP) to see if it improves back to baseline.     Also please remind him to sign up for [facility], as was unable to send his test results through [employer] bc still "pending". Once he signs up, I can send a detailed summary of his labs if he wishes, let me know if he wishes to be sent that  "

## 2019-02-19 LAB — O+P STL TRI STN: NORMAL

## 2019-02-20 ENCOUNTER — TELEPHONE (OUTPATIENT)
Dept: GASTROENTEROLOGY | Facility: CLINIC | Age: 59
End: 2019-02-20

## 2019-02-20 NOTE — TELEPHONE ENCOUNTER
Pt aware and understanding his stool study is negative for infection.           ----- Message from Ned Mccurdy PA-C sent at 2/20/2019  9:16 AM CST -----  Please notify the pt that his stool study was negative for infection

## 2019-02-21 NOTE — TELEPHONE ENCOUNTER
Patient called at [phone] and message relayed from Margi Pfeiffer NP.  Patient stated he would do the vaccinations and labs locally.  Patient wanted the Labs and message faxed to him.  Also encouraged to set up My Chart.  Message, labs and My Chart instructions faxed to [phone] as requested.  Patient verbalized understanding.  Receipt of confirmation fax received.

## 2020-08-31 ENCOUNTER — LAB VISIT (OUTPATIENT)
Dept: LAB | Facility: HOSPITAL | Age: 60
End: 2020-08-31
Attending: NURSE PRACTITIONER
Payer: COMMERCIAL

## 2020-08-31 ENCOUNTER — HOSPITAL ENCOUNTER (OUTPATIENT)
Dept: RADIOLOGY | Facility: HOSPITAL | Age: 60
Discharge: HOME | End: 2020-08-31
Attending: NURSE PRACTITIONER
Payer: COMMERCIAL

## 2020-08-31 DIAGNOSIS — R10.9 STOMACH ACHE: Primary | ICD-10-CM

## 2020-08-31 DIAGNOSIS — R10.9 STOMACH ACHE: ICD-10-CM

## 2020-08-31 DIAGNOSIS — R19.7 DIARRHEA OF PRESUMED INFECTIOUS ORIGIN: ICD-10-CM

## 2020-08-31 DIAGNOSIS — R11.0 NAUSEA: ICD-10-CM

## 2020-08-31 DIAGNOSIS — Z86.19 PERSONAL HISTORY OF UNSPECIFIED INFECTIOUS AND PARASITIC DISEASE: ICD-10-CM

## 2020-08-31 LAB
BASOPHILS # BLD AUTO: 0.04 K/UL (ref 0–0.2)
BASOPHILS NFR BLD: 0.6 % (ref 0–1.9)
DIFFERENTIAL METHOD: NORMAL
EOSINOPHIL # BLD AUTO: 0.1 K/UL (ref 0–0.5)
EOSINOPHIL NFR BLD: 2 % (ref 0–8)
ERYTHROCYTE [DISTWIDTH] IN BLOOD BY AUTOMATED COUNT: 12.8 % (ref 11.5–14.5)
HCT VFR BLD AUTO: 45 % (ref 40–54)
HGB BLD-MCNC: 15.5 G/DL (ref 14–18)
IMM GRANULOCYTES # BLD AUTO: 0.01 K/UL (ref 0–0.04)
IMM GRANULOCYTES NFR BLD AUTO: 0.1 % (ref 0–0.5)
LYMPHOCYTES # BLD AUTO: 1.5 K/UL (ref 1–4.8)
LYMPHOCYTES NFR BLD: 20.8 % (ref 18–48)
MCH RBC QN AUTO: 29.4 PG (ref 27–31)
MCHC RBC AUTO-ENTMCNC: 34.4 G/DL (ref 32–36)
MCV RBC AUTO: 85 FL (ref 82–98)
MONOCYTES # BLD AUTO: 0.6 K/UL (ref 0.3–1)
MONOCYTES NFR BLD: 9.2 % (ref 4–15)
NEUTROPHILS # BLD AUTO: 4.7 K/UL (ref 1.8–7.7)
NEUTROPHILS NFR BLD: 67.3 % (ref 38–73)
NRBC BLD-RTO: 0 /100 WBC
PLATELET # BLD AUTO: 327 K/UL (ref 150–350)
PMV BLD AUTO: 10.9 FL (ref 9.2–12.9)
RBC # BLD AUTO: 5.27 M/UL (ref 4.6–6.2)
TSH SERPL DL<=0.005 MIU/L-ACNC: 1.64 UIU/ML (ref 0.4–4)
WBC # BLD AUTO: 6.97 K/UL (ref 3.9–12.7)

## 2020-08-31 PROCEDURE — 84443 ASSAY THYROID STIM HORMONE: CPT

## 2020-08-31 PROCEDURE — 74019 RADEX ABDOMEN 2 VIEWS: CPT | Mod: TC

## 2020-08-31 PROCEDURE — 87324 CLOSTRIDIUM AG IA: CPT

## 2020-08-31 PROCEDURE — 36415 COLL VENOUS BLD VENIPUNCTURE: CPT

## 2020-08-31 PROCEDURE — 87449 NOS EACH ORGANISM AG IA: CPT

## 2020-08-31 PROCEDURE — 85025 COMPLETE CBC W/AUTO DIFF WBC: CPT

## 2020-09-01 LAB
C DIFF GDH STL QL: NEGATIVE
C DIFF TOX A+B STL QL IA: NEGATIVE

## 2020-10-12 ENCOUNTER — OFFICE VISIT (OUTPATIENT)
Dept: HEPATOLOGY | Facility: CLINIC | Age: 60
End: 2020-10-12
Payer: COMMERCIAL

## 2020-10-12 ENCOUNTER — LAB VISIT (OUTPATIENT)
Dept: LAB | Facility: HOSPITAL | Age: 60
End: 2020-10-12
Payer: COMMERCIAL

## 2020-10-12 ENCOUNTER — PATIENT MESSAGE (OUTPATIENT)
Dept: HEPATOLOGY | Facility: CLINIC | Age: 60
End: 2020-10-12

## 2020-10-12 VITALS
OXYGEN SATURATION: 98 % | DIASTOLIC BLOOD PRESSURE: 88 MMHG | WEIGHT: 205.94 LBS | BODY MASS INDEX: 27.29 KG/M2 | HEART RATE: 67 BPM | SYSTOLIC BLOOD PRESSURE: 141 MMHG | RESPIRATION RATE: 20 BRPM | HEIGHT: 73 IN

## 2020-10-12 DIAGNOSIS — K76.9 LIVER LESION: ICD-10-CM

## 2020-10-12 DIAGNOSIS — Z23 NEED FOR HEPATITIS A AND B VACCINATION: ICD-10-CM

## 2020-10-12 DIAGNOSIS — K76.9 LIVER LESION: Primary | ICD-10-CM

## 2020-10-12 LAB
AFP SERPL-MCNC: 4.9 NG/ML (ref 0–8.4)
ALBUMIN SERPL BCP-MCNC: 4.2 G/DL (ref 3.5–5.2)
ALP SERPL-CCNC: 70 U/L (ref 55–135)
ALT SERPL W/O P-5'-P-CCNC: 32 U/L (ref 10–44)
ANION GAP SERPL CALC-SCNC: 9 MMOL/L (ref 8–16)
AST SERPL-CCNC: 26 U/L (ref 10–40)
BILIRUB SERPL-MCNC: 0.4 MG/DL (ref 0.1–1)
BUN SERPL-MCNC: 14 MG/DL (ref 6–20)
CALCIUM SERPL-MCNC: 9.2 MG/DL (ref 8.7–10.5)
CANCER AG19-9 SERPL-ACNC: 5 U/ML (ref 2–40)
CEA SERPL-MCNC: 1 NG/ML (ref 0–5)
CHLORIDE SERPL-SCNC: 104 MMOL/L (ref 95–110)
CO2 SERPL-SCNC: 27 MMOL/L (ref 23–29)
CREAT SERPL-MCNC: 1 MG/DL (ref 0.5–1.4)
EST. GFR  (AFRICAN AMERICAN): >60 ML/MIN/1.73 M^2
EST. GFR  (NON AFRICAN AMERICAN): >60 ML/MIN/1.73 M^2
GLUCOSE SERPL-MCNC: 106 MG/DL (ref 70–110)
POTASSIUM SERPL-SCNC: 4.4 MMOL/L (ref 3.5–5.1)
PROT SERPL-MCNC: 6.8 G/DL (ref 6–8.4)
SODIUM SERPL-SCNC: 140 MMOL/L (ref 136–145)

## 2020-10-12 PROCEDURE — 99214 PR OFFICE/OUTPT VISIT, EST, LEVL IV, 30-39 MIN: ICD-10-PCS | Mod: S$GLB,,, | Performed by: NURSE PRACTITIONER

## 2020-10-12 PROCEDURE — 36415 COLL VENOUS BLD VENIPUNCTURE: CPT

## 2020-10-12 PROCEDURE — 99999 PR PBB SHADOW E&M-EST. PATIENT-LVL V: ICD-10-PCS | Mod: PBBFAC,,, | Performed by: NURSE PRACTITIONER

## 2020-10-12 PROCEDURE — 80053 COMPREHEN METABOLIC PANEL: CPT

## 2020-10-12 PROCEDURE — 82105 ALPHA-FETOPROTEIN SERUM: CPT

## 2020-10-12 PROCEDURE — 80321 ALCOHOLS BIOMARKERS 1OR 2: CPT

## 2020-10-12 PROCEDURE — 82378 CARCINOEMBRYONIC ANTIGEN: CPT

## 2020-10-12 PROCEDURE — 99999 PR PBB SHADOW E&M-EST. PATIENT-LVL V: CPT | Mod: PBBFAC,,, | Performed by: NURSE PRACTITIONER

## 2020-10-12 PROCEDURE — 3008F BODY MASS INDEX DOCD: CPT | Mod: CPTII,S$GLB,, | Performed by: NURSE PRACTITIONER

## 2020-10-12 PROCEDURE — 99214 OFFICE O/P EST MOD 30 MIN: CPT | Mod: S$GLB,,, | Performed by: NURSE PRACTITIONER

## 2020-10-12 PROCEDURE — 86301 IMMUNOASSAY TUMOR CA 19-9: CPT

## 2020-10-12 PROCEDURE — 3008F PR BODY MASS INDEX (BMI) DOCUMENTED: ICD-10-PCS | Mod: CPTII,S$GLB,, | Performed by: NURSE PRACTITIONER

## 2020-10-12 RX ORDER — ATORVASTATIN CALCIUM 20 MG/1
20 TABLET, FILM COATED ORAL DAILY
COMMUNITY
End: 2021-06-25

## 2020-10-12 NOTE — PROGRESS NOTES
[facility] VISIT FOLLOW UP NOTE    PCP:  Rain Ash MD     CHIEF COMPLAINT: fatty liver, subcm liver lesions x3    HPI: This is a 59 y.o. White male with PMH noted below, presenting for follow up of liver lesion and fatty liver disease without elevated liver enzymes.    Previous serologic w/u negative for Hep A, B and C in 2012    Liver fibrosis staging:  -- Fibroscan 2/2019 noted F0-1 (kPA 5.3), S3 ()    Liver lesion:  -- CT abd 2/7/19 noted liver hypoattenuating, suggesting steatosis. 3 subcentimeter hypoattenuating foci within the right hepatic lobe, too small for characterization, punctate in size  No biliary dilation. No pancreatic duct dilation.   No focal abnormalities noted on CT in 2013    Interval HPI: Presents today alone. Significantly decreased alcohol intake since 2019. Last seen 2/2019, lost to f/u. Was recommended to see staff MD 3 months after visit with MRI before (5/2019) but pt did not schedule     Labs done 2/2019 show normal transaminase levels in 2013 (no labs since)  Platelets, alk phos WNL.   Duration of elevated transaminase levels - normal in 2012 and 2013. Elevation of ALT x1  1/2013  Synthetic liver functioning WNL     Lab Results   Component Value Date    ALT 25 02/15/2019    AST 21 02/15/2019    ALKPHOS 84 02/15/2019    BILITOT 0.3 02/15/2019    ALBUMIN 4.5 02/15/2019    INR 0.9 02/15/2019     08/31/2020     Risk factors for fatty liver disease include alcohol use.   Minimal risk factors for NAFLD include being overweight. Weight gain in the past ~ 1 year    Denies family history of liver disease. decreased consistent Alcohol consumption, see below  Social History     Substance and Sexual Activity   Alcohol Use Yes    Comment: daily alcohol use for ~10 years until age 30. 18 beers weekly for most of life, decreased alcohol intake in 2019     Immunity to Hep A and B - needs [employer], sent to [facility] pharm and ID    Denies jaundice, dark urine, abdominal  distention, hematemesis, melena, slowed mentation. No abnormal skin rashes. No generalized joint or muscle pain.      Review of patient's allergies indicates:  No Known Allergies    Current Outpatient Medications on File Prior to Visit   Medication Sig Dispense Refill    buPROPion (WELLBUTRIN XL) 300 MG 24 hr tablet Take 300 mg by mouth once daily once daily.      colestipol (COLESTID) 1 gram Tab Take 1 tablet (1 g total) by mouth 2 (two) times daily. Separate from other medicine by 4hrs 60 tablet 3    escitalopram (LEXAPRO) 20 MG tablet Take 10 mg by mouth. 1 Tablet Oral Every day      fish oil-omega-3 fatty acids 300-1,000 mg capsule Take by mouth once daily.      testosterone cypionate (DEPO-TESTOSTERONE) 200 mg/mL injection Inject 1 mg/mL into the muscle. 1 Oil Intramuscular 2 weeks.  200 mg every 2 weeks        No current facility-administered medications on file prior to visit.        PMHX:  has a past medical history of Chronic diarrhea, GERD (gastroesophageal reflux disease), Hepatic steatosis (2/15/2019), Hernia of unspecified site of abdominal cavity without mention of obstruction or gangrene, HLD (hyperlipidemia), Intestinal infection due to Clostridium difficile, Irritable bowel syndrome with diarrhea (1/11/2019), Overweight (BMI 25.0-29.9) (2/15/2019), and Personal history of kidney stones.    PSHX:  has a past surgical history that includes Hiatal hernia repair; Colonoscopy; Upper gastrointestinal endoscopy; Amputation (left great toe); Foot surgery; Knee surgery; Back surgery; Neck surgery; Cholecystectomy; Sinus surgery; Fracture surgery; Spine surgery; and Inguinal hernia repair.    FAMILY HISTORY: Negative for liver disease, reviewed in [facility]    SOCIAL HISTORY:   Social History     Tobacco Use   Smoking Status Never Smoker   Smokeless Tobacco Never Used       Social History     Substance and Sexual Activity   Alcohol Use Yes    Comment: daily alcohol use for ~10 years until age 30. 18 beers  "weekly for most of life        Social History     Substance and Sexual Activity   Drug Use No       ROS:   GENERAL: Denies fever, chills, weight gain/loss, fatigue  HEENT: Denies headaches, dizziness, vision/hearing changes  CARDIOVASCULAR: Denies chest pain, palpitations, or edema  RESPIRATORY: Denies dyspnea, cough  GI: Denies abdominal pain, rectal bleeding, nausea, vomiting. No change in bowel pattern or color  : Denies dysuria, hematuria   SKIN: Denies rash, itching   NEURO: Denies confusion, memory loss, or mood changes  PSYCH: Denies depression or anxiety  HEME/LYMPH: Denies easy bruising or bleeding    PHYSICAL EXAM:   Friendly White male, in no acute distress; alert and oriented to person, place and time  VITALS: BP (!) 141/88 (BP Location: Left arm, Patient Position: Sitting, BP Method: Medium (Automatic))   Pulse 67   Resp 20   Ht 6' 1" (1.854 m)   Wt 93.4 kg (205 lb 14.6 oz)   SpO2 98%   BMI 27.17 kg/m²   HENT: Normocephalic, without obvious abnormality. Oral mucosa pink and moist. Dentition good.  EYES: Sclerae anicteric. No conjunctival pallor.   NECK: Supple. No masses or cervical adenopathy.  CARDIOVASCULAR: Regular rate and rhythm. No murmurs.  RESPIRATORY: Normal respiratory effort. BBS CTA. No wheezes or crackles.  GI: Soft, non-tender, non-distended. No hepatosplenomegaly. No masses palpable. No ascites.  EXTREMITIES:  No clubbing, cyanosis or edema.  SKIN: Warm and dry. No jaundice. No rashes noted to exposed skin. No telangectasias noted. No palmar erythema.  NEURO:  Normal gait. No asterixis.  PSYCH:  Memory intact. Thought and speech pattern appropriate. Behavior normal. No depression or anxiety noted.      DIAGNOSTIC STUDIES:    CT SCAN-   Done 2/7/19  FINDINGS:  Images of the lower thorax are remarkable for dependent atelectasis.    The liver is hypoattenuating, suggesting steatosis, correlation with LFTs recommended.  There are 3 subcentimeter hypoattenuating foci within the right " hepatic lobe, too small for characterization, punctate in size.  The spleen, pancreas and adrenal glands are grossly unremarkable.  The gallbladder is surgically absent.  There is no biliary dilation or ascites.  The pancreatic duct is not dilated.  The portal vein, splenic vein, SMV, celiac axis and SMA all are patent.  There is a mild to moderate residual contrast within the gastric lumen.  No significant abdominal lymphadenopathy.    The kidneys enhance symmetrically without hydronephrosis or nephrolithiasis.  The urinary bladder is decompressed, likely accounting for mild wall thickening.  The prostate is not enlarged.    There are several scattered colonic diverticula.  There is mild thickening of the rectosigmoid colon, similar to the previous examination noting the colon is decompressed in the region.  No surrounding inflammation to suggest acute diverticulitis.  The terminal ileum and appendix are unremarkable.  The small bowel is grossly unremarkable.  No bulky pelvic lymphadenopathy.  No focal organized pelvic fluid collection.  There is a fat containing left inguinal hernia.    Postsurgical changes are noted in the region of the left hemiscrotum.  Degenerative changes are noted of the spine noting sequela of previous surgery.  No significant inguinal lymphadenopathy.      Impression       1. Findings suggesting hepatic steatosis, correlation with LFTs recommended.  2. Scattered colonic diverticula without findings to suggest diverticulitis.  3. Status post cholecystectomy, and additional findings above.       MRI-  Needs to be scheduled     LIVER BIOPSY-    FIBROSCAN -  Done today 2/15/19   Fibroscan readin.3 KPa  Fibrosis:F 0-1   CAP readin dB/m  Steatosis: :S3     EDUCATION:   We discussed the manifestations of alcohol and non-alcoholic fatty liver disease. At this time, there are no FDA approved therapy for non-alcoholic fatty liver disease.  The patient and I discussed the importance of  diet, exercise, and weight loss for management of non-alcoholic fatty liver disease.    Also discussed importance of alcohol abstinence or at least decreasing to no more than CDC definition of moderate alcohol use     We discussed a low carb/sugar, high fiber and protein diet and a goal of 30 minutes of exercise 5 times per week    Patient is aware that fatty liver can progress to steatohepatitis and possibly to cirrhosis, putting one at increased risk for liver cancer, liver failure, and death    ASSESSMENT & PLAN:  59 y.o. White male with:  1.  Liver lesions  -- CT 2/2019 showed 3 subcentimeter hypoattenuating foci within the right hepatic lobe, too small for characterization, punctate in size  -- not present on CT in 2013  -- recommendation was to repeat in 5/2019 but pt did not schedule  -- labs today, MRI soon   Orders Placed This Encounter   Procedures    MRI Abdomen W WO Contrast    Hepatitis A / Hepatitis B Combined Vaccine (IM)    Comprehensive Metabolic Panel    Phosphatidylethanol (PETH)    AFP Tumor Marker    Cancer Antigen 19-9    CEA    Ambulatory referral/consult to Infectious Disease Injection Dept     2. Fatty liver  -- fibroscan in 2019 without fibrosis. Normal liver enzymes, no indication for repeat currently   -- Previous serologic w/u negative for Hep A, B and C in 2012  -- Recommendations discussed with pt   1. Weight loss goal of ~10 lbs  2. Low carb/sugar, high fiber and protein diet  3. Exercise, 5 days per week, 30 minutes per day, as tolerated  4. Recommend good cholesterol, blood pressure, blood sugar levels   -- Risk factors for fatty liver disease include alcohol use. Minimal risk factors for NAFLD include being overweight. Weight gain in the past ~ 1 year  -- Immunity to Hep A and B : needs [employer] vaccine, [facility]  -- Fibroscan results in 2019- no fibrosis, significant steatosis    3. Overweight  -- There is no height or weight on file to calculate BMI.   --  can increase the risk for NAFLD    Labs and MRI soon    Follow up for pending results of workup.  Then needs staff physician MD f/u long term given liver lesions surveillance likely needed     ADDENDUM 10/20/20: Liver lesions discussed in IR conference, recs noted below    Discussion/Plan(pre conference): 9mm focus of enhancement in segment 4. T2 bright. Indeterminate, probable flash filling hemangioma. Similar in size to CT from 2/2019. Also, appears to be present on study from 2013. Additional foci not visualized. Study limited secondary to motion.   Recommendations: no follow-up needed.      Committee Discussion/Plan:   Hemangioma. No further f/u needed.    Given no surveillance needed for liver lesions and also given fatty liver with normal lab values, no fibrosis on fibroscan - no need for hepatology f/u. Recommend f/u yearly with PCP with liver labs and to schedule f/u appt if liver enzymes increase above normal in the future      Thank you for allowing me to participate in the care of Blaine GomesKEO Rodriguez    CC'ed note to:   Rain Ash MD

## 2020-10-12 NOTE — PATIENT INSTRUCTIONS
1. Recommend Hep A and B vaccine, see below   2. Labs today  3.  MRI was due 5/2019. MRI soon to re-evaluate liver lesions. You will need to be followed by a liver physician long term because of the liver lesions if they remain on the repeat imaging. If so, I will let you know through [facility] after your repeat MRI if discussed with our group       There is no FDA approved therapy for non-alcoholic fatty liver disease. Therefore, these things are important:  1. Weight loss goal of ~10 lbs  2. Low carb/sugar, high fiber and protein diet  3. Exercise, 5 days per week, 30 minutes per day, as tolerated  4. Recommend good cholesterol, blood pressure, blood sugar levels      Fatty liver can progress to steatohepatitis (inflamatory fatty liver) and possibly to cirrhosis, putting one at increased risk for liver cancer, liver failure, and death    HEP A/B VACCINE  Your immunity markers show that you do NOT have immunity against Hepatitis A or B, so I recommend that you receive the combination Hepatitis A and B vaccine called TwinRix. This will protect your liver from these viruses, which can make your liver very sick.     Hep A can be transmitted through food and water and can cause significant liver injury. There is a Hepatitis A outbreak in [place] currently. Hep B vaccine is transmitted through blood or bodily fluids (there are no symptoms typically) and can develop longstanding (chronic) Hep B and there is no cure, so many people have it for life. Therefore, the vaccines provide immunity against these viruses that can cause harm to the liver.     The vaccine series is 3 vaccines: one now, one at 4 weeks and one 6 months after the 1st one. I sent the vaccine to the [facility] pharmacy. They will determine if the vaccines are covered by your insurance and arrange the vaccines if they are covered. Their number is [phone] if you have any questions or need to contact them.      If the vaccine is not covered  at the pharmacy level, I sent an order that you can get the vaccine in the infectious disease department at [facility]. If that is the case, please call [phone] to schedule your vaccine administration appointment in the infectious disease department.  If you need to proceed with vaccines with the infectious disease department, you can call to obtain a cost for these vaccines before you proceed, you can call the [facility] office at [phone] or [phone]

## 2020-10-12 NOTE — Clinical Note
FYI    Given no surveillance needed for liver lesions and also given fatty liver with normal lab values, no fibrosis on fibroscan - no need for hepatology f/u. Recommend f/u yearly with PCP with liver labs and to schedule f/u appt if liver enzymes increase above normal in the future

## 2020-10-13 ENCOUNTER — HOSPITAL ENCOUNTER (OUTPATIENT)
Dept: RADIOLOGY | Facility: HOSPITAL | Age: 60
Discharge: HOME | End: 2020-10-13
Attending: NURSE PRACTITIONER
Payer: COMMERCIAL

## 2020-10-13 DIAGNOSIS — K76.9 LIVER LESION: ICD-10-CM

## 2020-10-13 PROCEDURE — 74183 MRI ABD W/O CNTR FLWD CNTR: CPT | Mod: TC

## 2020-10-13 PROCEDURE — 25500020 PHARM REV CODE 255: Performed by: NURSE PRACTITIONER

## 2020-10-13 PROCEDURE — A9585 GADOBUTROL INJECTION: HCPCS | Performed by: NURSE PRACTITIONER

## 2020-10-13 RX ADMIN — GADOBUTROL 30 ML: 604.72 INJECTION INTRAVENOUS at 09:10

## 2020-10-15 ENCOUNTER — TELEPHONE (OUTPATIENT)
Dept: HEPATOLOGY | Facility: CLINIC | Age: 60
End: 2020-10-15

## 2020-10-15 LAB — PHOSPHATIDYLETHANOL (PETH): 73 NG/ML

## 2020-10-15 NOTE — TELEPHONE ENCOUNTER
Patient: Blaine Tong       MRN: [id number]      : 1960     Age: 59 y.o.  [address] [place] [id number]    Provider: YANN - Margi Pfeiffer NP    Priority of review: Benign disease    Patient Transplant Status: Other hepatology pt     Reason for presentation: Indeterminate lesion    Clinical Summary: 59 year old male with liver lesions noted incidentally on imaging in 2019    -- CT abd 19 noted liver hypoattenuating, suggesting steatosis. 3 subcentimeter hypoattenuating foci within the right hepatic lobe, too small for characterization, punctate in size  No biliary dilation. No pancreatic duct dilation.   No focal abnormalities noted on CT in 2013    Was supposed to have repeat imaging in 3 months in 2019 but pt did not complete     MRI done 10/2020 noted of the previously described subcentimeter lesions within the right hepatic lobe are not discernible on this study.  Within the lateral segment of the left hepatic lobe anterior parenchyma subcapsular portion of the liver, there is a rounded 9 mm focus of T2 hyperintensity which is relatively hypointense on early phase post-contrast imaging completely filling in with contrast on delayed phase imaging and remaining with internal homogeneous contrast on the last delayed phase.  The margins appear well defined.       AFP, Ca 19-9 and CEA normal       Imaging to be reviewed: CT 2019, MRI 10/2020    HCC Treatment History: n/a    ABO:     Platelets:   Lab Results   Component Value Date/Time     2020 02:08 PM     Creatinine:   Lab Results   Component Value Date/Time    CREATININE 1.0 10/12/2020 11:11 AM     Bilirubin:   Lab Results   Component Value Date/Time    BILITOT 0.4 10/12/2020 11:11 AM     AFP Last 3 each if available:   Lab Results   Component Value Date/Time    AFP 4.9 10/12/2020 11:11 AM    AFP 6.0 02/15/2019 03:00 PM       MELD: Computed MELD-Na score unavailable. Necessary lab results were not found in the last year.  Computed MELD  score unavailable. Necessary lab results were not found in the last year.    Plan:     Follow-up Provider:   DAYSI Pfeiffer NP

## 2020-10-20 ENCOUNTER — TELEPHONE (OUTPATIENT)
Dept: HEPATOLOGY | Facility: CLINIC | Age: 60
End: 2020-10-20

## 2020-10-20 ENCOUNTER — CONFERENCE (OUTPATIENT)
Dept: TRANSPLANT | Facility: CLINIC | Age: 60
End: 2020-10-20

## 2020-10-20 NOTE — TELEPHONE ENCOUNTER
Patient: Blaine Tong       MRN: [id number]      : 1960     Age: 59 y.o.  [address] [place] [id number]    Provider: YANN - Margi Pfeiffer NP    Priority of review: Benign disease    Patient Transplant Status: Other hepatology pt     Reason for presentation: Indeterminate lesion    Clinical Summary: 59 year old male with liver lesions noted incidentally on imaging in 2019    -- CT abd 19 noted liver hypoattenuating, suggesting steatosis. 3 subcentimeter hypoattenuating foci within the right hepatic lobe, too small for characterization, punctate in size  No biliary dilation. No pancreatic duct dilation.   No focal abnormalities noted on CT in 2013    Was supposed to have repeat imaging in 3 months in 2019 but pt did not complete     MRI done 10/2020 noted of the previously described subcentimeter lesions within the right hepatic lobe are not discernible on this study.  Within the lateral segment of the left hepatic lobe anterior parenchyma subcapsular portion of the liver, there is a rounded 9 mm focus of T2 hyperintensity which is relatively hypointense on early phase post-contrast imaging completely filling in with contrast on delayed phase imaging and remaining with internal homogeneous contrast on the last delayed phase.  The margins appear well defined.       AFP, Ca 19-9 and CEA normal       Imaging to be reviewed: CT 2019, MRI 10/2020    HCC Treatment History: n/a    ABO:     Platelets:   Lab Results   Component Value Date/Time     2020 02:08 PM     Creatinine:   Lab Results   Component Value Date/Time    CREATININE 1.0 10/12/2020 11:11 AM     Bilirubin:   Lab Results   Component Value Date/Time    BILITOT 0.4 10/12/2020 11:11 AM     AFP Last 3 each if available:   Lab Results   Component Value Date/Time    AFP 4.9 10/12/2020 11:11 AM    AFP 6.0 02/15/2019 03:00 PM       MELD: Computed MELD-Na score unavailable. Necessary lab results were not found in the last year.  Computed MELD  score unavailable. Necessary lab results were not found in the last year.    Discussion/Plan(pre conference): 9mm focus of enhancement in segment 4. T2 bright. Indeterminate, probable flash filling hemangioma. Similar in size to CT from 2/2019. Also, appears to be present on study from 2013. Additional foci not visualized. Study limited secondary to motion.   Recommendations: no follow-up needed.     Committee Discussion/Plan:   Hemangioma. No further f/u needed.      Follow-up Provider:   DAYSI Pfeiffer NP

## 2020-10-20 NOTE — TELEPHONE ENCOUNTER
Reviewed IR conference recs below:    Discussion/Plan(pre conference): 9mm focus of enhancement in segment 4. T2 bright. Indeterminate, probable flash filling hemangioma. Similar in size to CT from 2/2019. Also, appears to be present on study from 2013. Additional foci not visualized. Study limited secondary to motion.   Recommendations: no follow-up needed.      Committee Discussion/Plan:   Hemangioma. No further f/u needed.      Please notify pt that Given no surveillance needed for liver lesions and also given fatty liver with normal lab values, no fibrosis on fibroscan - no need for hepatology f/u. Recommend f/u yearly with PCP with liver labs and to schedule f/u appt if liver enzymes increase above normal in the future

## 2020-10-21 NOTE — TELEPHONE ENCOUNTER
Patient informed that Given no surveillance needed for liver lesions and also given fatty liver with normal lab values, no fibrosis on fibroscan - no need for hepatology f/u.   Recommend f/u yearly with PCP with liver labs and to schedule f/u appt if liver enzymes increase above normal in the future.    Patient voiced understanding.

## 2021-05-04 ENCOUNTER — PATIENT MESSAGE (OUTPATIENT)
Dept: RESEARCH | Facility: HOSPITAL | Age: 61
End: 2021-05-04

## 2021-05-10 ENCOUNTER — PATIENT MESSAGE (OUTPATIENT)
Dept: RESEARCH | Facility: HOSPITAL | Age: 61
End: 2021-05-10

## 2021-07-01 ENCOUNTER — PATIENT MESSAGE (OUTPATIENT)
Dept: ADMINISTRATIVE | Facility: OTHER | Age: 61
End: 2021-07-01

## 2021-08-16 PROBLEM — I65.23 BILATERAL CAROTID ARTERY STENOSIS: Status: ACTIVE | Noted: 2021-08-16

## 2021-08-16 PROBLEM — E78.5 DYSLIPIDEMIA: Status: ACTIVE | Noted: 2021-08-16

## 2021-08-16 PROBLEM — R03.0 ELEVATED BP WITHOUT DIAGNOSIS OF HYPERTENSION: Status: ACTIVE | Noted: 2021-08-16

## 2022-05-10 ENCOUNTER — LAB VISIT (OUTPATIENT)
Dept: LAB | Facility: HOSPITAL | Age: 62
End: 2022-05-10
Attending: INTERNAL MEDICINE
Payer: COMMERCIAL

## 2022-05-10 DIAGNOSIS — Z01.818 PREOP EXAMINATION: ICD-10-CM

## 2022-05-10 DIAGNOSIS — M54.59 DISCOGENIC LUMBAR PAIN: ICD-10-CM

## 2022-05-10 LAB
ANION GAP SERPL CALC-SCNC: 4 MMOL/L (ref 8–16)
APTT BLDCRRT: 22.3 SEC (ref 21–32)
BASOPHILS # BLD AUTO: 0.08 K/UL (ref 0–0.2)
BASOPHILS NFR BLD: 1.2 % (ref 0–1.9)
BILIRUB UR QL STRIP: NEGATIVE
BUN SERPL-MCNC: 21 MG/DL (ref 8–23)
CALCIUM SERPL-MCNC: 8.5 MG/DL (ref 8.7–10.5)
CHLORIDE SERPL-SCNC: 108 MMOL/L (ref 95–110)
CLARITY UR: CLEAR
CO2 SERPL-SCNC: 27 MMOL/L (ref 23–29)
COLOR UR: YELLOW
CREAT SERPL-MCNC: 1.1 MG/DL (ref 0.5–1.4)
DIFFERENTIAL METHOD: NORMAL
EOSINOPHIL # BLD AUTO: 0.2 K/UL (ref 0–0.5)
EOSINOPHIL NFR BLD: 3.5 % (ref 0–8)
ERYTHROCYTE [DISTWIDTH] IN BLOOD BY AUTOMATED COUNT: 13.1 % (ref 11.5–14.5)
EST. GFR  (AFRICAN AMERICAN): >60 ML/MIN/1.73 M^2
EST. GFR  (NON AFRICAN AMERICAN): >60 ML/MIN/1.73 M^2
GLUCOSE SERPL-MCNC: 109 MG/DL (ref 70–110)
GLUCOSE UR QL STRIP: NEGATIVE
HCT VFR BLD AUTO: 40.5 % (ref 40–54)
HGB BLD-MCNC: 14.3 G/DL (ref 14–18)
HGB UR QL STRIP: NEGATIVE
IMM GRANULOCYTES # BLD AUTO: 0.02 K/UL (ref 0–0.04)
IMM GRANULOCYTES NFR BLD AUTO: 0.3 % (ref 0–0.5)
INR PPP: 0.8 (ref 0.8–1.2)
KETONES UR QL STRIP: NEGATIVE
LEUKOCYTE ESTERASE UR QL STRIP: NEGATIVE
LYMPHOCYTES # BLD AUTO: 1.4 K/UL (ref 1–4.8)
LYMPHOCYTES NFR BLD: 22.2 % (ref 18–48)
MCH RBC QN AUTO: 29.7 PG (ref 27–31)
MCHC RBC AUTO-ENTMCNC: 35.3 G/DL (ref 32–36)
MCV RBC AUTO: 84 FL (ref 82–98)
MONOCYTES # BLD AUTO: 0.6 K/UL (ref 0.3–1)
MONOCYTES NFR BLD: 9.7 % (ref 4–15)
NEUTROPHILS # BLD AUTO: 4.1 K/UL (ref 1.8–7.7)
NEUTROPHILS NFR BLD: 63.1 % (ref 38–73)
NITRITE UR QL STRIP: NEGATIVE
NRBC BLD-RTO: 0 /100 WBC
PH UR STRIP: 7 [PH] (ref 5–8)
PLATELET # BLD AUTO: 279 K/UL (ref 150–450)
PMV BLD AUTO: 10.8 FL (ref 9.2–12.9)
POTASSIUM SERPL-SCNC: 3.8 MMOL/L (ref 3.5–5.1)
PROT UR QL STRIP: NEGATIVE
PROTHROMBIN TIME: 9.6 SEC (ref 9–12.5)
RBC # BLD AUTO: 4.82 M/UL (ref 4.6–6.2)
SODIUM SERPL-SCNC: 139 MMOL/L (ref 136–145)
SP GR UR STRIP: 1.02 (ref 1–1.03)
URN SPEC COLLECT METH UR: NORMAL
UROBILINOGEN UR STRIP-ACNC: 1 EU/DL
WBC # BLD AUTO: 6.49 K/UL (ref 3.9–12.7)

## 2022-05-10 PROCEDURE — 85025 COMPLETE CBC W/AUTO DIFF WBC: CPT | Performed by: INTERNAL MEDICINE

## 2022-05-10 PROCEDURE — 36415 COLL VENOUS BLD VENIPUNCTURE: CPT | Performed by: INTERNAL MEDICINE

## 2022-05-10 PROCEDURE — 80048 BASIC METABOLIC PNL TOTAL CA: CPT | Performed by: INTERNAL MEDICINE

## 2022-05-10 PROCEDURE — 81003 URINALYSIS AUTO W/O SCOPE: CPT | Performed by: INTERNAL MEDICINE

## 2022-05-10 PROCEDURE — 85730 THROMBOPLASTIN TIME PARTIAL: CPT | Performed by: INTERNAL MEDICINE

## 2022-05-10 PROCEDURE — 85610 PROTHROMBIN TIME: CPT | Performed by: INTERNAL MEDICINE

## 2022-10-19 ENCOUNTER — LAB VISIT (OUTPATIENT)
Dept: LAB | Facility: HOSPITAL | Age: 62
End: 2022-10-19
Attending: INTERNAL MEDICINE
Payer: COMMERCIAL

## 2022-10-19 DIAGNOSIS — Z13.29 SCREENING FOR THYROID DISORDER: ICD-10-CM

## 2022-10-19 DIAGNOSIS — Z12.5 SCREENING PSA (PROSTATE SPECIFIC ANTIGEN): ICD-10-CM

## 2022-10-19 DIAGNOSIS — Z13.220 SCREENING FOR LIPID DISORDERS: ICD-10-CM

## 2022-10-19 DIAGNOSIS — Z13.21 SCREENING FOR MALNUTRITION: ICD-10-CM

## 2022-10-19 DIAGNOSIS — Z13.1 SCREENING FOR DIABETES MELLITUS: ICD-10-CM

## 2022-10-19 DIAGNOSIS — Z13.0 SCREENING FOR DEFICIENCY ANEMIA: ICD-10-CM

## 2022-10-19 LAB
ALBUMIN SERPL BCP-MCNC: 3.9 G/DL (ref 3.5–5.2)
ALP SERPL-CCNC: 81 U/L (ref 55–135)
ALT SERPL W/O P-5'-P-CCNC: 30 U/L (ref 10–44)
ANION GAP SERPL CALC-SCNC: 2 MMOL/L (ref 8–16)
AST SERPL-CCNC: 16 U/L (ref 10–40)
BASOPHILS # BLD AUTO: 0.05 K/UL (ref 0–0.2)
BASOPHILS NFR BLD: 0.9 % (ref 0–1.9)
BILIRUB SERPL-MCNC: 0.4 MG/DL (ref 0.1–1)
BUN SERPL-MCNC: 16 MG/DL (ref 8–23)
CALCIUM SERPL-MCNC: 9.1 MG/DL (ref 8.7–10.5)
CHLORIDE SERPL-SCNC: 107 MMOL/L (ref 95–110)
CHOLEST SERPL-MCNC: 238 MG/DL (ref 120–199)
CHOLEST/HDLC SERPL: 6 {RATIO} (ref 2–5)
CO2 SERPL-SCNC: 31 MMOL/L (ref 23–29)
COMPLEXED PSA SERPL-MCNC: 0.25 NG/ML (ref 0–4)
CREAT SERPL-MCNC: 1 MG/DL (ref 0.5–1.4)
DIFFERENTIAL METHOD: NORMAL
EOSINOPHIL # BLD AUTO: 0.2 K/UL (ref 0–0.5)
EOSINOPHIL NFR BLD: 3.7 % (ref 0–8)
ERYTHROCYTE [DISTWIDTH] IN BLOOD BY AUTOMATED COUNT: 12.7 % (ref 11.5–14.5)
EST. GFR  (NO RACE VARIABLE): >60 ML/MIN/1.73 M^2
GLUCOSE SERPL-MCNC: 106 MG/DL (ref 70–110)
HCT VFR BLD AUTO: 43.9 % (ref 40–54)
HDLC SERPL-MCNC: 40 MG/DL (ref 40–75)
HDLC SERPL: 16.8 % (ref 20–50)
HGB BLD-MCNC: 14.8 G/DL (ref 14–18)
IMM GRANULOCYTES # BLD AUTO: 0.01 K/UL (ref 0–0.04)
IMM GRANULOCYTES NFR BLD AUTO: 0.2 % (ref 0–0.5)
LDLC SERPL CALC-MCNC: ABNORMAL MG/DL (ref 63–159)
LYMPHOCYTES # BLD AUTO: 1.4 K/UL (ref 1–4.8)
LYMPHOCYTES NFR BLD: 25.9 % (ref 18–48)
MCH RBC QN AUTO: 27.8 PG (ref 27–31)
MCHC RBC AUTO-ENTMCNC: 33.7 G/DL (ref 32–36)
MCV RBC AUTO: 82 FL (ref 82–98)
MONOCYTES # BLD AUTO: 0.5 K/UL (ref 0.3–1)
MONOCYTES NFR BLD: 9.1 % (ref 4–15)
NEUTROPHILS # BLD AUTO: 3.3 K/UL (ref 1.8–7.7)
NEUTROPHILS NFR BLD: 60.2 % (ref 38–73)
NONHDLC SERPL-MCNC: 198 MG/DL
NRBC BLD-RTO: 0 /100 WBC
PLATELET # BLD AUTO: 287 K/UL (ref 150–450)
PMV BLD AUTO: 10.6 FL (ref 9.2–12.9)
POTASSIUM SERPL-SCNC: 4.2 MMOL/L (ref 3.5–5.1)
PROT SERPL-MCNC: 7.1 G/DL (ref 6–8.4)
RBC # BLD AUTO: 5.33 M/UL (ref 4.6–6.2)
SODIUM SERPL-SCNC: 140 MMOL/L (ref 136–145)
TRIGL SERPL-MCNC: 402 MG/DL (ref 30–150)
TSH SERPL DL<=0.005 MIU/L-ACNC: 1.24 UIU/ML (ref 0.4–4)
WBC # BLD AUTO: 5.41 K/UL (ref 3.9–12.7)

## 2022-10-19 PROCEDURE — 36415 COLL VENOUS BLD VENIPUNCTURE: CPT | Performed by: INTERNAL MEDICINE

## 2022-10-19 PROCEDURE — 84443 ASSAY THYROID STIM HORMONE: CPT | Performed by: INTERNAL MEDICINE

## 2022-10-19 PROCEDURE — 80053 COMPREHEN METABOLIC PANEL: CPT | Performed by: INTERNAL MEDICINE

## 2022-10-19 PROCEDURE — 82306 VITAMIN D 25 HYDROXY: CPT | Performed by: INTERNAL MEDICINE

## 2022-10-19 PROCEDURE — 84153 ASSAY OF PSA TOTAL: CPT | Performed by: INTERNAL MEDICINE

## 2022-10-19 PROCEDURE — 85025 COMPLETE CBC W/AUTO DIFF WBC: CPT | Performed by: INTERNAL MEDICINE

## 2022-10-19 PROCEDURE — 80061 LIPID PANEL: CPT | Performed by: INTERNAL MEDICINE

## 2022-10-20 PROBLEM — Z00.00 WELLNESS EXAMINATION: Status: ACTIVE | Noted: 2022-10-20

## 2022-10-20 PROBLEM — M54.16 LUMBAR RADICULOPATHY: Status: ACTIVE | Noted: 2022-10-20

## 2022-10-20 LAB — 25(OH)D3+25(OH)D2 SERPL-MCNC: 86 NG/ML (ref 30–96)

## 2023-01-06 PROBLEM — M53.2X8 INSTABILITY OF RIGHT SI JOINT: Status: ACTIVE | Noted: 2023-01-06

## 2023-01-23 PROBLEM — Z00.00 WELLNESS EXAMINATION: Status: RESOLVED | Noted: 2022-10-20 | Resolved: 2023-01-23

## 2023-06-22 PROBLEM — M53.3 SI (SACROILIAC) JOINT DYSFUNCTION: Status: ACTIVE | Noted: 2023-06-22

## 2023-10-04 ENCOUNTER — PATIENT MESSAGE (OUTPATIENT)
Dept: ADMINISTRATIVE | Facility: OTHER | Age: 63
End: 2023-10-04
Payer: COMMERCIAL

## 2023-12-13 PROBLEM — J00 ACUTE RHINITIS: Status: ACTIVE | Noted: 2023-12-13

## 2023-12-18 ENCOUNTER — HOSPITAL ENCOUNTER (OUTPATIENT)
Dept: RADIOLOGY | Facility: HOSPITAL | Age: 63
Discharge: HOME | End: 2023-12-18
Attending: INTERNAL MEDICINE
Payer: COMMERCIAL

## 2023-12-18 DIAGNOSIS — R05.9 COUGH, UNSPECIFIED TYPE: ICD-10-CM

## 2023-12-18 PROCEDURE — 71046 X-RAY EXAM CHEST 2 VIEWS: CPT | Mod: TC

## 2024-01-04 PROBLEM — R05.2 SUBACUTE COUGH: Status: ACTIVE | Noted: 2024-01-04

## 2024-01-04 PROBLEM — M10.9 GOUT, UNSPECIFIED: Status: ACTIVE | Noted: 2024-01-04

## 2024-04-07 ENCOUNTER — HOSPITAL ENCOUNTER (EMERGENCY)
Facility: HOSPITAL | Age: 64
Discharge: HOME | End: 2024-04-07
Attending: EMERGENCY MEDICINE
Payer: COMMERCIAL

## 2024-04-07 VITALS
HEIGHT: 73 IN | OXYGEN SATURATION: 98 % | HEART RATE: 70 BPM | BODY MASS INDEX: 25.93 KG/M2 | TEMPERATURE: 98 F | WEIGHT: 195.69 LBS | DIASTOLIC BLOOD PRESSURE: 73 MMHG | SYSTOLIC BLOOD PRESSURE: 146 MMHG | RESPIRATION RATE: 18 BRPM

## 2024-04-07 DIAGNOSIS — M54.2 NECK PAIN: Primary | ICD-10-CM

## 2024-04-07 PROCEDURE — 99284 EMERGENCY DEPT VISIT MOD MDM: CPT | Mod: 25

## 2024-04-07 RX ORDER — PROMETHAZINE HYDROCHLORIDE 12.5 MG/1
12.5 TABLET ORAL 4 TIMES DAILY
COMMUNITY

## 2024-04-07 RX ORDER — ONDANSETRON HYDROCHLORIDE 8 MG/1
8 TABLET, FILM COATED ORAL 2 TIMES DAILY
COMMUNITY

## 2024-04-07 RX ORDER — DIAZEPAM 10 MG/2ML
2 INJECTION INTRAMUSCULAR
Status: DISCONTINUED | OUTPATIENT
Start: 2024-04-07 | End: 2024-04-07 | Stop reason: HOSPADM

## 2024-04-07 RX ORDER — IBUPROFEN 800 MG/1
800 TABLET ORAL 3 TIMES DAILY
COMMUNITY

## 2024-04-07 RX ORDER — HYDROMORPHONE HYDROCHLORIDE 2 MG/ML
2 INJECTION, SOLUTION INTRAMUSCULAR; INTRAVENOUS; SUBCUTANEOUS
Status: DISCONTINUED | OUTPATIENT
Start: 2024-04-07 | End: 2024-04-07 | Stop reason: HOSPADM

## 2024-04-07 RX ORDER — ONDANSETRON HYDROCHLORIDE 2 MG/ML
8 INJECTION, SOLUTION INTRAVENOUS
Status: DISCONTINUED | OUTPATIENT
Start: 2024-04-07 | End: 2024-04-07 | Stop reason: HOSPADM

## 2024-04-07 RX ORDER — FOLIC ACID 0.4 MG
400 TABLET ORAL DAILY
COMMUNITY

## 2024-04-07 RX ORDER — FENTANYL 12.5 UG/1
1 PATCH TRANSDERMAL
COMMUNITY

## 2024-04-07 RX ORDER — OXYCODONE HYDROCHLORIDE 15 MG/1
10 TABLET ORAL EVERY 4 HOURS PRN
COMMUNITY

## 2024-04-07 RX ORDER — SENNOSIDES 8.6 MG/1
17.2 TABLET ORAL DAILY
COMMUNITY

## 2024-04-07 RX ORDER — NALOXONE HYDROCHLORIDE 4 MG/.1ML
SPRAY NASAL
Qty: 1 EACH | Refills: 11 | Status: SHIPPED | OUTPATIENT
Start: 2024-04-07

## 2024-04-07 RX ORDER — PROMETHAZINE HYDROCHLORIDE 25 MG/1
25 SUPPOSITORY RECTAL EVERY 6 HOURS PRN
COMMUNITY

## 2024-04-07 RX ORDER — BENZONATATE 200 MG/1
200 CAPSULE ORAL 3 TIMES DAILY PRN
COMMUNITY

## 2024-04-07 NOTE — ED PROVIDER NOTES
Encounter Date: 4/7/2024       History     Chief Complaint   Patient presents with    Neck Pain     Pt reports has a hx of bone cancer and had a rough night with neck pain.     63-year-old male with a history of metastatic lung cancer with lymphangitic carcinomatosis presents the ER with pain.  He has metastasis to bone in many areas, including the cervical spine.  Patient states that he had a significant change in his pain last night to the base of his skull.  Complaining of pain with any movement, took 2 oxycodone 10s, Flexeril, Xanax, fentanyl patch, and Tylenol with some relief, pain now 5/10.  He is being seen at [facility] and followed there for this.  Had 1st round of chemotherapy as well.  Denies fever.  Alert oriented x4, GCS is 15      Review of patient's allergies indicates:   Allergen Reactions    Zetia [ezetimibe] Diarrhea     Past Medical History:   Diagnosis Date    Anxiety     Blepharochalasis of left lower eyelid     Blepharochalasis of right lower eyelid     Calcified granuloma of lung     Cervical disc disease     Cervical radiculopathy     Chronic diarrhea     Chronic gastritis     GERD (gastroesophageal reflux disease)     Gout, unspecified     Hair loss     Headache, migraine     Hepatic steatosis 02/15/2019    Hernia of unspecified site of abdominal cavity without mention of obstruction or gangrene     HLD (hyperlipidemia)     Hyperlipidemia     Hypertriglyceridemia     Internal hemorrhoid     Intestinal infection due to Clostridium difficile     Irritable bowel syndrome with diarrhea 01/11/2019    Lumbar pain     Male hypogonadism     Neck pain     Overweight (BMI 25.0-29.9) 02/15/2019    Personal history of kidney stones     Right foot pain     TIA (transient ischemic attack)     Vitamin D deficiency      Past Surgical History:   Procedure Laterality Date    AMPUTATION  left great toe    BACK SURGERY      x 3    BACK SURGERY  06/09/2022    CHOLECYSTECTOMY  10/29/2012    [facility]- biliary  duskinesia    COLONOSCOPY  04/11/2012    [facility]- int hemorrhoids, friability/erythema, prox rectum/sigmoid colon    FOOT SURGERY  12/2010    x 4    FRACTURE SURGERY      HIATAL HERNIA REPAIR      INGUINAL HERNIA REPAIR      x2    KNEE SURGERY Bilateral 2011    Kam orthoscopic x2 right knee x2 left knee    NECK SURGERY  01/2007    si joint fusion Right     SINUS SURGERY  07/2018    Zerangue sinoplasty w balloon    SPINE SURGERY  1988    1989, 1990    UPPER GASTROINTESTINAL ENDOSCOPY  09/14/2021    [facility] gastritis, small hh     Family History   Problem Relation Age of Onset    Stroke Mother     Hyperlipidemia Mother     Hypertension Father     Hyperlipidemia Father     Heart disease Father     Stroke Father     Cancer Brother         lung    Cancer Maternal Grandmother         breast    Stroke Maternal Grandmother     Heart attack Paternal Grandfather     Colon cancer Neg Hx     Rectal cancer Neg Hx     Stomach cancer Neg Hx     Liver cancer Neg Hx     Esophageal cancer Neg Hx     Cirrhosis Neg Hx      Social History     Tobacco Use    Smoking status: Never    Smokeless tobacco: Never   Substance Use Topics    Alcohol use: Yes     Comment: daily alcohol use for ~10 years until age 30. 18 beers weekly for most of life, decreased alcohol intake in 2019    Drug use: No     Review of Systems   Constitutional:  Negative for fever.   HENT:  Negative for sore throat.    Respiratory:  Negative for shortness of breath.    Cardiovascular:  Negative for chest pain.   Gastrointestinal:  Negative for nausea.   Genitourinary:  Negative for dysuria.   Musculoskeletal:  Positive for arthralgias, joint swelling and neck pain. Negative for back pain.   Skin:  Negative for rash.   Neurological:  Negative for weakness.   Hematological:  Does not bruise/bleed easily.   All other systems reviewed and are negative.      Physical Exam     Initial Vitals   BP Pulse Resp Temp SpO2   04/07/24 1009 04/07/24 1009 04/07/24 1009 04/07/24  1020 04/07/24 1009   120/74 68 18 97.8 °F (36.6 °C) 97 %      MAP       --                Physical Exam    Nursing note and vitals reviewed.  Constitutional: He appears well-developed and well-nourished. He is not diaphoretic. No distress.   HENT:   Head: Normocephalic and atraumatic.   Eyes: Conjunctivae and EOM are normal. Pupils are equal, round, and reactive to light. Right eye exhibits no discharge. Left eye exhibits no discharge. No scleral icterus.   Neck: Neck supple. No JVD present.   Muscular tenderness noted at the base of the skull, no color change in skin   Cardiovascular:  Normal rate, regular rhythm, normal heart sounds and intact distal pulses.           No murmur heard.  Pulmonary/Chest: Breath sounds normal. No stridor. No respiratory distress. He has no wheezes. He has no rhonchi. He has no rales. He exhibits no tenderness.   Abdominal: Abdomen is soft. Bowel sounds are normal. He exhibits no distension and no mass. There is no abdominal tenderness. There is no rebound and no guarding.   Musculoskeletal:         General: Tenderness present. No edema. Normal range of motion.      Cervical back: Neck supple.     Neurological: He is alert and oriented to person, place, and time. He has normal strength. GCS score is 15. GCS eye subscore is 4. GCS verbal subscore is 5. GCS motor subscore is 6.   Skin: Skin is warm and dry. Capillary refill takes less than 2 seconds.         ED Course   Procedures  Labs Reviewed - No data to display       Imaging Results              CT Cervical Spine Without Contrast (Final result)  Result time 04/07/24 11:57:41      Final result by Louie Solis MD (04/07/24 11:57:41)                   Impression:      1. No acute CT abnormality detected.  No convincing focal lytic or sclerotic osseous lesions within the confines of this modality.  2. Multilevel spondylotic changes and operative fusion findings without evidence for hardware complication.  3. 1.2 cm irregular right  apical pulmonary nodule developed since the prior chest CT considered nonspecific.  Neoplasm and nodular scarring are in the differential.  There is a provided history of lung cancer.  Correlate with any interval imaging that may have been performed at an outside facility since the most recent prior CT performed at this facility on 07/01/2019.      Electronically signed by: Louie Solis MD  Date:    04/07/2024  Time:    11:57               Narrative:    EXAMINATION:  CT CERVICAL SPINE WITHOUT CONTRAST    CLINICAL HISTORY:  Neck pain, history of metastatic lung cancer with lith injected carcinomatosis and osseous metastases now with increasing pain at the base of the skull metastatic disease evaluation;    TECHNIQUE:  Axial CT images were obtained through the cervical spine without contrast.  Multiplanar reconstructions evaluated.  Iterative reconstruction technique was used.  CT/Cardiac nuclear examinations in the past 12 months: 0    COMPARISON:  Images from previous cervical spine radiographs 09/18/2019.  No prior report available at the time of current interpretation.    FINDINGS:  Hypertrophic change present at the at Landau axial joint.  Anterior fusion plate at C4, C5, and C6 with operative screws present.  No lucency surrounding the screws to suggest loosening or infection.  No evidence for hardware fracture or hardware distraction.  Disc space devices are in place at C4-C5 and C5-C6.  No acute fracture or subluxation detected.  Minimal grade 1 anterolisthesis of C7 on T1.  Remainder of the vertebral bodies demonstrate maintained alignment.  Disc space device is in place at C6-C7.  Multilevel marginal osteophytes are present.  Right-sided facet arthropathy at C2-C3 and C4-C5.  Bilateral facet arthropathy at C7-T1 left greater than right.  Occipital condyles are intact.  The dens is intact.  Imaged portions of the mastoid air cells are clear.  1.2 cm anterior right apical pulmonary nodule axial image 97 with  somewhat irregular margins considered nonspecific.                                       Medications   HYDROmorphone (PF) injection 2 mg (0 mg Intravenous Hold 4/7/24 1015)   ondansetron injection 8 mg (0 mg Intravenous Hold 4/7/24 1015)   diazePAM injection 2 mg (0 mg Intravenous Hold 4/7/24 1015)     Medical Decision Making  Amount and/or Complexity of Data Reviewed  Radiology: ordered.    Risk  Prescription drug management.                          Medical Decision Making:   Differential Diagnosis:   Cancer related pain             Clinical Impression:  Final diagnoses:  [M54.2] Neck pain (Primary)          ED Disposition Condition    Discharge Stable          ED Prescriptions    None       Follow-up Information       Follow up With Specialties Details Why Contact Info Additional Information    Primary care physician  In 2 days       [facility] Emergency Department Emergency Medicine  As needed, If symptoms worsen [address] [place] [phone] Floor 1             Poli Esquivel MD  04/07/24 1214

## 2024-05-23 ENCOUNTER — HOSPITAL ENCOUNTER (EMERGENCY)
Facility: HOSPITAL | Age: 64
Discharge: HOME | End: 2024-05-23
Attending: FAMILY MEDICINE
Payer: COMMERCIAL

## 2024-05-23 VITALS
WEIGHT: 190 LBS | BODY MASS INDEX: 25.18 KG/M2 | TEMPERATURE: 98 F | HEART RATE: 87 BPM | SYSTOLIC BLOOD PRESSURE: 131 MMHG | OXYGEN SATURATION: 98 % | HEIGHT: 73 IN | DIASTOLIC BLOOD PRESSURE: 79 MMHG | RESPIRATION RATE: 18 BRPM

## 2024-05-23 DIAGNOSIS — E87.1 HYPONATREMIA: ICD-10-CM

## 2024-05-23 DIAGNOSIS — R05.9 COUGH IN ADULT: ICD-10-CM

## 2024-05-23 DIAGNOSIS — C34.90 MALIGNANT NEOPLASM OF LUNG, UNSPECIFIED LATERALITY, UNSPECIFIED PART OF LUNG: Primary | ICD-10-CM

## 2024-05-23 DIAGNOSIS — R53.1 WEAKNESS: ICD-10-CM

## 2024-05-23 DIAGNOSIS — G89.4 CHRONIC PAIN SYNDROME: ICD-10-CM

## 2024-05-23 LAB
ALBUMIN SERPL BCP-MCNC: 3.4 G/DL (ref 3.5–5.2)
ALP SERPL-CCNC: 106 U/L (ref 55–135)
ALT SERPL W/O P-5'-P-CCNC: 33 U/L (ref 10–44)
ANION GAP SERPL CALC-SCNC: 8 MMOL/L (ref 3–11)
APTT PPP: 25 SEC (ref 21–32)
AST SERPL-CCNC: 30 U/L (ref 10–40)
BASOPHILS # BLD AUTO: 0.03 K/UL (ref 0–0.2)
BASOPHILS NFR BLD: 0.5 % (ref 0–1.9)
BILIRUB SERPL-MCNC: 0.7 MG/DL (ref 0.1–1)
BUN SERPL-MCNC: 15 MG/DL (ref 8–23)
CALCIUM SERPL-MCNC: 9.1 MG/DL (ref 8.7–10.5)
CHLORIDE SERPL-SCNC: 101 MMOL/L (ref 95–110)
CO2 SERPL-SCNC: 22 MMOL/L (ref 23–29)
CREAT SERPL-MCNC: 0.8 MG/DL (ref 0.5–1.4)
DIFFERENTIAL METHOD BLD: ABNORMAL
EOSINOPHIL # BLD AUTO: 0.1 K/UL (ref 0–0.5)
EOSINOPHIL NFR BLD: 0.9 % (ref 0–8)
ERYTHROCYTE [DISTWIDTH] IN BLOOD BY AUTOMATED COUNT: 14.6 % (ref 11.5–14.5)
EST. GFR  (NO RACE VARIABLE): >60 ML/MIN/1.73 M^2
GLUCOSE SERPL-MCNC: 118 MG/DL (ref 70–110)
HCT VFR BLD AUTO: 38.6 % (ref 40–54)
HGB BLD-MCNC: 13.2 G/DL (ref 14–18)
IMM GRANULOCYTES # BLD AUTO: 0.02 K/UL (ref 0–0.04)
IMM GRANULOCYTES NFR BLD AUTO: 0.3 % (ref 0–0.5)
INR PPP: 0.9 (ref 0.8–1.2)
LYMPHOCYTES # BLD AUTO: 0.5 K/UL (ref 1–4.8)
LYMPHOCYTES NFR BLD: 7.8 % (ref 18–48)
MCH RBC QN AUTO: 28.1 PG (ref 27–31)
MCHC RBC AUTO-ENTMCNC: 34.2 G/DL (ref 32–36)
MCV RBC AUTO: 82 FL (ref 82–98)
MONOCYTES # BLD AUTO: 0.1 K/UL (ref 0.3–1)
MONOCYTES NFR BLD: 1.8 % (ref 4–15)
NEUTROPHILS # BLD AUTO: 5.8 K/UL (ref 1.8–7.7)
NEUTROPHILS NFR BLD: 88.7 % (ref 38–73)
NRBC BLD-RTO: 0 /100 WBC
PLATELET # BLD AUTO: 430 K/UL (ref 150–450)
PMV BLD AUTO: 9.1 FL (ref 9.2–12.9)
POTASSIUM SERPL-SCNC: 4 MMOL/L (ref 3.5–5.1)
PROT SERPL-MCNC: 7.3 G/DL (ref 6–8.4)
PROTHROMBIN TIME: 10.5 SEC (ref 9–12.5)
RBC # BLD AUTO: 4.7 M/UL (ref 4.6–6.2)
SODIUM SERPL-SCNC: 131 MMOL/L (ref 136–145)
WBC # BLD AUTO: 6.55 K/UL (ref 3.9–12.7)

## 2024-05-23 PROCEDURE — 85025 COMPLETE CBC W/AUTO DIFF WBC: CPT | Performed by: FAMILY MEDICINE

## 2024-05-23 PROCEDURE — 25000003 PHARM REV CODE 250: Performed by: FAMILY MEDICINE

## 2024-05-23 PROCEDURE — 96374 THER/PROPH/DIAG INJ IV PUSH: CPT

## 2024-05-23 PROCEDURE — 93005 ELECTROCARDIOGRAM TRACING: CPT

## 2024-05-23 PROCEDURE — 99285 EMERGENCY DEPT VISIT HI MDM: CPT | Mod: 25

## 2024-05-23 PROCEDURE — 63600175 PHARM REV CODE 636 W HCPCS: Performed by: FAMILY MEDICINE

## 2024-05-23 PROCEDURE — 36415 COLL VENOUS BLD VENIPUNCTURE: CPT | Performed by: FAMILY MEDICINE

## 2024-05-23 PROCEDURE — 85730 THROMBOPLASTIN TIME PARTIAL: CPT | Performed by: FAMILY MEDICINE

## 2024-05-23 PROCEDURE — 85610 PROTHROMBIN TIME: CPT | Performed by: FAMILY MEDICINE

## 2024-05-23 PROCEDURE — 93010 ELECTROCARDIOGRAM REPORT: CPT | Mod: ,,, | Performed by: INTERNAL MEDICINE

## 2024-05-23 PROCEDURE — 80053 COMPREHEN METABOLIC PANEL: CPT | Performed by: FAMILY MEDICINE

## 2024-05-23 PROCEDURE — 96361 HYDRATE IV INFUSION ADD-ON: CPT

## 2024-05-23 PROCEDURE — 96375 TX/PRO/DX INJ NEW DRUG ADDON: CPT

## 2024-05-23 RX ORDER — LIDOCAINE HYDROCHLORIDE 20 MG/ML
5 SOLUTION OROPHARYNGEAL
Status: COMPLETED | OUTPATIENT
Start: 2024-05-23 | End: 2024-05-23

## 2024-05-23 RX ORDER — HYDROMORPHONE HYDROCHLORIDE 1 MG/ML
1 INJECTION, SOLUTION INTRAMUSCULAR; INTRAVENOUS; SUBCUTANEOUS
Status: COMPLETED | OUTPATIENT
Start: 2024-05-23 | End: 2024-05-23

## 2024-05-23 RX ORDER — ONDANSETRON HYDROCHLORIDE 2 MG/ML
4 INJECTION, SOLUTION INTRAVENOUS
Status: COMPLETED | OUTPATIENT
Start: 2024-05-23 | End: 2024-05-23

## 2024-05-23 RX ADMIN — LIDOCAINE HYDROCHLORIDE 5 ML: 20 SOLUTION ORAL at 08:05

## 2024-05-23 RX ADMIN — ONDANSETRON 4 MG: 2 INJECTION INTRAMUSCULAR; INTRAVENOUS at 08:05

## 2024-05-23 RX ADMIN — SODIUM CHLORIDE 1000 ML: 9 INJECTION, SOLUTION INTRAVENOUS at 08:05

## 2024-05-23 RX ADMIN — HYDROMORPHONE HYDROCHLORIDE 1 MG: 1 INJECTION, SOLUTION INTRAMUSCULAR; INTRAVENOUS; SUBCUTANEOUS at 08:05

## 2024-05-24 LAB
OHS QRS DURATION: 82 MS
OHS QTC CALCULATION: 465 MS

## 2024-05-24 NOTE — ED PROVIDER NOTES
Encounter Date: 5/23/2024       History     Chief Complaint   Patient presents with    Dehydration     State 4 lung cancer with extensive bone justin, radiation to c-spine and right arm , unable to go today but did have radiation Monday Tuesday and Wednesday. Patient is spitting up blood, family member concerned for chemical/radiation burns. He was scheduled to have iv fluids tomorrow in [place].      63-year-old male with significant past medical history of stage IV lung cancer with Mets to spine, hips, brain.  Presents the ED with reports of sore throat and decreased appetite.  Patient reports he is receiving chemotherapy and radiation therapy.  He is on morphine and fentanyl patches for pain control.  Patient states every time he tries to drink feels like glass in his throat.        Review of patient's allergies indicates:   Allergen Reactions    Zetia [ezetimibe] Diarrhea     Past Medical History:   Diagnosis Date    Anxiety     Blepharochalasis of left lower eyelid     Blepharochalasis of right lower eyelid     Calcified granuloma of lung     Cervical disc disease     Cervical radiculopathy     Chronic diarrhea     Chronic gastritis     GERD (gastroesophageal reflux disease)     Gout, unspecified     Hair loss     Headache, migraine     Hepatic steatosis 02/15/2019    Hernia of unspecified site of abdominal cavity without mention of obstruction or gangrene     HLD (hyperlipidemia)     Hyperlipidemia     Hypertriglyceridemia     Internal hemorrhoid     Intestinal infection due to Clostridium difficile     Irritable bowel syndrome with diarrhea 01/11/2019    Lumbar pain     Male hypogonadism     Neck pain     Overweight (BMI 25.0-29.9) 02/15/2019    Personal history of kidney stones     Right foot pain     TIA (transient ischemic attack)     Vitamin D deficiency      Past Surgical History:   Procedure Laterality Date    AMPUTATION  left great toe    BACK SURGERY      x 3    BACK SURGERY  06/09/2022     CHOLECYSTECTOMY  10/29/2012    [facility]- biliary duskinesia    COLONOSCOPY  04/11/2012    [facility]- int hemorrhoids, friability/erythema, prox rectum/sigmoid colon    FOOT SURGERY  12/2010    x 4    FRACTURE SURGERY      HIATAL HERNIA REPAIR      INGUINAL HERNIA REPAIR      x2    KNEE SURGERY Bilateral 2011    [facility] orthoscopic x2 right knee x2 left knee    NECK SURGERY  01/2007    si joint fusion Right     SINUS SURGERY  07/2018    [facility]erangue sinoplasty w balloon    SPINE SURGERY  1988    1989, 1990    UPPER GASTROINTESTINAL ENDOSCOPY  09/14/2021    [facility] gastritis, small hh     Family History   Problem Relation Name Age of Onset    Stroke Mother      Hyperlipidemia Mother      Hypertension Father      Hyperlipidemia Father      Heart disease Father      Stroke Father      Cancer Brother          lung    Cancer Maternal Grandmother          breast    Stroke Maternal Grandmother      Heart attack Paternal Grandfather      Colon cancer Neg Hx      Rectal cancer Neg Hx      Stomach cancer Neg Hx      Liver cancer Neg Hx      Esophageal cancer Neg Hx      Cirrhosis Neg Hx       Social History     Tobacco Use    Smoking status: Never    Smokeless tobacco: Never   Substance Use Topics    Alcohol use: Yes     Comment: daily alcohol use for ~10 years until age 30. 18 beers weekly for most of life, decreased alcohol intake in 2019    Drug use: No     Review of Systems   HENT:  Positive for trouble swallowing.    All other systems reviewed and are negative.      Physical Exam     Initial Vitals [05/23/24 2028]   BP Pulse Resp Temp SpO2   131/79 87 18 97.6 °F (36.4 °C) 98 %      MAP       --         Physical Exam    Nursing note and vitals reviewed.  Constitutional: Vital signs are normal. He appears well-developed and well-nourished. He is not diaphoretic.  Non-toxic appearance. He does not have a sickly appearance.   HENT:   Head: Normocephalic and atraumatic.   Right Ear: Hearing, tympanic membrane, external ear and ear  canal normal.   Left Ear: Tympanic membrane, external ear and ear canal normal.   Mouth/Throat: Uvula is midline, oropharynx is clear and moist and mucous membranes are normal. Mucous membranes are not pale, not dry and not cyanotic. He does not have dentures. No oral lesions. No trismus in the jaw. Normal dentition. No dental abscesses, uvula swelling, lacerations or dental caries. No oropharyngeal exudate, posterior oropharyngeal edema, posterior oropharyngeal erythema or tonsillar abscesses.   Eyes: Conjunctivae, EOM and lids are normal. Pupils are equal, round, and reactive to light. Lids are everted and swept, no foreign bodies found.   Neck: Trachea normal and phonation normal. Neck supple.   Normal range of motion.   Full passive range of motion without pain.     Cardiovascular:  Normal rate, regular rhythm, normal heart sounds, intact distal pulses and normal pulses.           Pulmonary/Chest: Breath sounds normal. No respiratory distress. He has no wheezes. He has no rhonchi. He has no rales.   Abdominal: Abdomen is soft. Bowel sounds are normal. There is no abdominal tenderness.   Musculoskeletal:      Cervical back: Normal, full passive range of motion without pain, normal range of motion and neck supple.      Thoracic back: Normal.      Lumbar back: Normal.     Neurological: He is alert and oriented to person, place, and time. He has normal strength. No cranial nerve deficit or sensory deficit.   Skin: Skin is intact.   Psychiatric: He has a normal mood and affect. His speech is normal and behavior is normal.         ED Course   Procedures  Labs Reviewed   CBC W/ AUTO DIFFERENTIAL - Abnormal; Notable for the following components:       Result Value    Hemoglobin 13.2 (*)     Hematocrit 38.6 (*)     RDW 14.6 (*)     MPV 9.1 (*)     Lymph # 0.5 (*)     Mono # 0.1 (*)     Gran % 88.7 (*)     Lymph % 7.8 (*)     Mono % 1.8 (*)     All other components within normal limits   COMPREHENSIVE METABOLIC PANEL -  Abnormal; Notable for the following components:    Sodium 131 (*)     CO2 22 (*)     Glucose 118 (*)     Albumin 3.4 (*)     All other components within normal limits   APTT   PROTIME-INR     EKG Readings: (Independently Interpreted)   Rhythm: Normal Sinus Rhythm. Heart Rate: 93. Ectopy: No Ectopy. Conduction: Normal. ST Segments: Normal ST Segments. T Waves: Normal. Clinical Impression: Normal Sinus Rhythm       Imaging Results              X-Ray Chest 1 View (Final result)  Result time 05/23/24 22:03:13      Final result by Neo Solis MD (05/23/24 22:03:13)                   Impression:      No evidence of an acute pulmonary process.      Electronically signed by: Neo Solis MD  Date:    05/23/2024  Time:    22:03               Narrative:    EXAMINATION:  XR CHEST 1 VIEW    CLINICAL HISTORY:  Cough    COMPARISON:  12/18/2023    FINDINGS:  Cardiac silhouette is normal in size.  No focal infiltrate or pleural effusion.  There is fusion hardware of the cervical spine.                        Wet Read by Scott Sierra Jr., MD (05/23/24 21:09:08, [facility] Emergency Department, Emergency Medicine)    No acute abnormality                                     Medications   sodium chloride 0.9% bolus 1,000 mL 1,000 mL (0 mLs Intravenous Stopped 5/23/24 2151)   HYDROmorphone injection 1 mg (1 mg Intravenous Given 5/23/24 2052)   LIDOcaine viscous HCl 2% oral solution 5 mL (5 mLs Oral Given 5/23/24 2052)   ondansetron injection 4 mg (4 mg Intravenous Given 5/23/24 2052)     Medical Decision Making  Amount and/or Complexity of Data Reviewed  Labs: ordered.  Radiology: ordered and independent interpretation performed.    Risk  Prescription drug management.                          Medical Decision Making:   Differential Diagnosis:   Dysphagia, sore throat, dehydration, pneumonia  Clinical Tests:   Lab Tests: Ordered and Reviewed  The following lab test(s) were unremarkable: CBC and CMP  Radiological Study: Ordered and  Reviewed  ED Management:  No acute findings on labs or x-ray.  Patient reports pain and discomfort resolved after p.o. viscous lidocaine.  Patient was able to tolerate p.o. fluids without difficulty.  Discussed with patient will start on magic mouthwash and need to follow-up with PCP for continued care of severe pulmonary carcinoma Mets.  Patient reports he understands plan of care is ready for discharge home             Clinical Impression:  Final diagnoses:  [R53.1] Weakness  [R05.9] Cough in adult  [C34.90] Malignant neoplasm of lung, unspecified laterality, unspecified part of lung (Primary)  [G89.4] Chronic pain syndrome  [E87.1] Hyponatremia          ED Disposition Condition    Discharge Stable          ED Prescriptions       Medication Sig Dispense Start Date End Date Auth. Provider    diphenhydrAMINE-aluminum-magnesium hydroxide-simethicone-LIDOcaine viscous HCl 2% Swish and spit 15 mLs every 4 (four) hours as needed. 300 each 5/23/2024 -- Scott Sierra Jr., MD          Follow-up Information       Follow up With Specialties Details Why Contact Info    Rain Ash MD Internal Medicine In 1 day As needed, If symptoms worsen [address] [facility] [address]  [phone]               Scott Sierra Jr., MD  05/24/24 2485

## 2024-08-30 ENCOUNTER — TELEPHONE (OUTPATIENT)
Dept: PALLIATIVE MEDICINE | Facility: CLINIC | Age: 64
End: 2024-08-30
Payer: COMMERCIAL